# Patient Record
Sex: MALE | Race: WHITE | Employment: FULL TIME | URBAN - METROPOLITAN AREA
[De-identification: names, ages, dates, MRNs, and addresses within clinical notes are randomized per-mention and may not be internally consistent; named-entity substitution may affect disease eponyms.]

---

## 2017-01-04 PROCEDURE — 82274 ASSAY TEST FOR BLOOD FECAL: CPT | Mod: 90 | Performed by: FAMILY MEDICINE

## 2017-01-04 PROCEDURE — 99000 SPECIMEN HANDLING OFFICE-LAB: CPT | Performed by: FAMILY MEDICINE

## 2017-01-05 DIAGNOSIS — Z12.11 SPECIAL SCREENING FOR MALIGNANT NEOPLASMS, COLON: ICD-10-CM

## 2017-01-05 LAB — HEMOCCULT STL QL IA: NEGATIVE

## 2017-02-24 ENCOUNTER — OFFICE VISIT (OUTPATIENT)
Dept: CARDIOLOGY | Facility: CLINIC | Age: 57
End: 2017-02-24
Payer: COMMERCIAL

## 2017-02-24 VITALS
SYSTOLIC BLOOD PRESSURE: 121 MMHG | OXYGEN SATURATION: 96 % | DIASTOLIC BLOOD PRESSURE: 75 MMHG | WEIGHT: 247.4 LBS | BODY MASS INDEX: 35.5 KG/M2 | HEART RATE: 78 BPM

## 2017-02-24 DIAGNOSIS — I10 ESSENTIAL HYPERTENSION: ICD-10-CM

## 2017-02-24 DIAGNOSIS — Z95.2 S/P AVR: Primary | ICD-10-CM

## 2017-02-24 DIAGNOSIS — E78.5 HYPERLIPIDEMIA LDL GOAL <100: ICD-10-CM

## 2017-02-24 DIAGNOSIS — I21.4 NSTEMI (NON-ST ELEVATED MYOCARDIAL INFARCTION) (H): ICD-10-CM

## 2017-02-24 PROCEDURE — 99204 OFFICE O/P NEW MOD 45 MIN: CPT | Performed by: INTERNAL MEDICINE

## 2017-02-24 RX ORDER — ATORVASTATIN CALCIUM 40 MG/1
40 TABLET, FILM COATED ORAL DAILY
Qty: 90 TABLET | Refills: 3 | Status: SHIPPED | OUTPATIENT
Start: 2017-02-24 | End: 2018-04-10

## 2017-02-24 RX ORDER — OMEGA-3 FATTY ACIDS/FISH OIL 300-1000MG
200 CAPSULE ORAL EVERY 4 HOURS PRN
COMMUNITY

## 2017-02-24 RX ORDER — LISINOPRIL 10 MG/1
10 TABLET ORAL DAILY
Qty: 90 TABLET | Refills: 3 | Status: SHIPPED | OUTPATIENT
Start: 2017-02-24 | End: 2018-04-10

## 2017-02-24 RX ORDER — METOPROLOL TARTRATE 25 MG/1
12.5 TABLET, FILM COATED ORAL 2 TIMES DAILY
Qty: 90 TABLET | Refills: 3 | Status: SHIPPED | OUTPATIENT
Start: 2017-02-24 | End: 2018-04-10

## 2017-02-24 ASSESSMENT — PAIN SCALES - GENERAL: PAINLEVEL: NO PAIN (0)

## 2017-02-24 NOTE — PATIENT INSTRUCTIONS
Thank you for your [facility] visit today. Your provider has recommended the following:  Medication Changes:  None today. Continue taking your medications as you have been.  Recommendations:  Echocardiogram in 2 years  Follow-up:  See Dr. Lane for cardiology follow up in 2 years.  We kindly ask that you call cardiology scheduling at [phone] three months prior to requested revisit date to schedule future cardiology appointments.  Reminder:  1. Please bring in your current medication list or your medication, over the counter supplements and vitamin bottles as we will review these at each office visit.               [facility] HEART CARE  [facility]~[address]~[place], [place]~[address]  Questions about your visit today?  Call your Cardiology Clinic RN's-Amada Dow and/or Makayla Lancaster at [phone].

## 2017-02-24 NOTE — LETTER
2/24/2017    Homero Sanabria MD  [facility]   [address] [address]    RE: Roderick Willis       Dear Colleague,    I had the pleasure to follow up with your patient, Mr. Roderick Willis, in the Cardiovascular Medicine Clinic.  As you recall, this is a 56-year-old gentleman who is establishing local cardiac care.  His cardiac history is notable for apparent exertional dyspnea and chest pain leading to a presentation in 02/2016 to [facility].  At that point, the patient was diagnosed with significant and severe aortic stenosis.  He apparently had a bicuspid aortic valve with evidence of ascending aortic dilatation.  He underwent an evaluation which did not demonstrate any significant obstructive coronary artery disease and ultimately underwent repair with placement of a 23 mm Magna Juárez valve along with a 30 mm Gelweave graft of the ascending aorta.  The patient on followup studies has had evidence of patient prosthesis mismatch, however, has been followed from a clinical standpoint.  The ejection fraction is preserved and the Doppler parameters suggested PPM with a peak velocity of 3.2 meters per second, a mean gradient of 22 with an effective orifice area of 0.75 cm2.  The patient is currently asymptomatic.  He reports of no chest pain, PND, orthopnea, syncope or any other cardiac related concerns.  He states that the symptoms that plagued him leading to his presentation have all since resolved.  He presents to establish local cardiac care.      PAST MEDICAL HISTORY:   1.  Severe aortic stenosis, bicuspid in nature.   2.  Status post aortic valve replacement with a 23 mm Magna Juárez valve and repair of the ascending aorta with a 30 mm Gelweave graft.   3.  Closure of a patent foramen ovale identified during the intraoperative MAKENZIE.   4.  Hyperlipidemia.   5.  Hypertension.      CURRENT MEDICATIONS:   1.  Metoprolol 25 mg daily.   2.  Lipitor 40 mg daily.   3.   Lisinopril 10 mg daily.   4.  Aspirin 81 mg daily.      Please see additional noncardiac medications.      ALLERGIES:  No known drug allergies.      SOCIAL HISTORY:  Works in an automotive repair shop, former smoker, occasional alcohol intake.  Lives with family.      FAMILY HISTORY:  Negative for premature coronary artery disease or valvular heart disease.      REVIEW OF SYSTEMS:  10-point review of systems negative, otherwise as specified in the HPI.      PHYSICAL EXAMINATION:   VITAL SIGNS:  Blood pressure 121/75, heart rate is 78, weight 247 pounds.   GENERAL:  The patient is alert, oriented, no apparent distress.   HEENT:  Oropharynx is clear.   NECK:  Supple, no carotid bruits.   CARDIOVASCULAR:  Distant heart tones, normal S1, S2 with a 2/6 systolic ejection murmur best heard at the left upper sternal border.   LUNGS:  Coarse diffusely.  Patient is recovering from a cold with expiratory rhonchi.   ABDOMEN:  Obese, soft, nontender.   EXTREMITIES:  No edema.      ASSESSMENT:      1.  Bicuspid aortic valve, status post aortic valve replacement with a bioprosthetic valve and repair of the ascending aortic aneurysm.     2.  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  No symptoms at present.   3.  Hypertension.   4.  Hyperlipidemia.      RECOMMENDATIONS:  The patient is doing well from a clinical standpoint.  We will keep a close eye on his bioprosthetic valve by performing an echocardiogram in approximately 2 years' timeframe.  Patient is advised if he has any symptoms that are reminiscent of his presentation last year, he will contact us sooner.   I have taken the liberty of refilling his current medications and we will have him follow up with us in approximately 2 years' time.  Prior to that he will have a surveillance echocardiogram.   It was a pleasure being involved in his care.     Sincerely,    Brett Lane MD     [facility]

## 2017-02-24 NOTE — MR AVS SNAPSHOT
After Visit Summary   2/24/2017    Roderick Willis    MRN: [id number]           Patient Information     Date Of Birth          1960        Visit Information        Provider Department      2/24/2017 1:45 PM Brett Lane MD [facility] PHYSICIAN HEART AT [facility]        Today's Diagnoses     S/P AVR    -  1    NSTEMI (non-ST elevated myocardial infarction) (H)        Essential hypertension        Hyperlipidemia LDL goal <100          Care Instructions    Thank you for your  Heart Care visit today. Your provider has recommended the following:  Medication Changes:  None today. Continue taking your medications as you have been.  Recommendations:  Echocardiogram in 2 years  Follow-up:  See Dr. Lane for cardiology follow up in 2 years.  We kindly ask that you call cardiology scheduling at [phone] three months prior to requested revisit date to schedule future cardiology appointments.  Reminder:  1. Please bring in your current medication list or your medication, over the counter supplements and vitamin bottles as we will review these at each office visit.               [facility] CARE  [facility]~[address]~[place], [place]~[address]  Questions about your visit today?  Call your Cardiology Clinic RN's-Amada Dow and/or Makayla Lancaster at [phone].              Follow-ups after your visit        Additional Services     Follow-Up with Cardiologist                 Future tests that were ordered for you today     Open Future Orders        Priority Expected Expires Ordered    Echocardiogram Routine 2/24/2019 2/24/2020 2/24/2017    Follow-Up with Cardiologist Routine 2/24/2019 2/24/2020 2/24/2017            Who to contact     If you have questions or need follow up information about today's clinic visit or your schedule please contact [facility] PHYSICIAN HEART AT [facility] directly at [phone].  Normal  "or non-critical lab and imaging results will be communicated to you by MyChart, letter or phone within 4 business days after the clinic has received the results. If you do not hear from us within 7 days, please contact the clinic through [employer] or phone. If you have a critical or abnormal lab result, we will notify you by phone as soon as possible.  Submit refill requests through [employer] or call your pharmacy and they will forward the refill request to us. Please allow 3 business days for your refill to be completed.          Additional Information About Your Visit        [employer] Hospital[facility] [employer] Information     [employer] lets you send messages to your doctor, view your test results, renew your prescriptions, schedule appointments and more. To sign up, go to [email or web address] . Click on \"Log in\" on the left side of the screen, which will take you to the Welcome page. Then click on \"Sign up Now\" on the right side of the page.     You will be asked to enter the access code listed below, as well as some personal information. Please follow the directions to create your username and password.     Your access code is: [email or web address]  Expires: 3/9/2017  9:19 AM     Your access code will  in 90 days. If you need help or a new code, please call your [facility] clinic or [phone].        Care EveryWhere ID     This is your Care EveryWhere ID. This could be used by other organizations to access your [facility] medical records  [id number]        Your Vitals Were     Pulse Pulse Oximetry BMI (Body Mass Index)             78 96% 35.5 kg/m2          Blood Pressure from Last 3 Encounters:   17 121/75   16 114/70   16 (!) 160/93    Weight from Last 3 Encounters:   17 112.2 kg (247 lb 6.4 oz)   16 108.9 kg (240 lb)   04/15/16 99.1 kg (218 lb 8 oz)               Primary Care Provider Office Phone # Fax #    Homero Sanabria [phone]       [facility] [address]  [facility] [address]        Thank you!     Thank you for choosing [facility] PHYSICIAN HEART AT [facility]  for your care. Our goal is always to provide you with excellent care. Hearing back from our patients is one way we can continue to improve our services. Please take a few minutes to complete the written survey that you may receive in the mail after your visit with us. Thank you!             Your Updated Medication List - Protect others around you: Learn how to safely use, store and throw away your medicines at www.disposemymeds.org.          This list is accurate as of: 2/24/17  2:03 PM.  Always use your most recent med list.                   Brand Name Dispense Instructions for use    acetaminophen 650 MG 8 hour tablet     100 tablet    Take 650 mg by mouth every 4 hours as needed for other (surgical pain)       aspirin 81 MG tablet      Take 1 tablet (81 mg) by mouth daily       atorvastatin 40 MG tablet    LIPITOR    90 tablet    Take 1 tablet (40 mg) by mouth daily       ibuprofen 200 MG capsule      Take 200 mg by mouth every 4 hours as needed for fever       lisinopril 10 MG tablet    PRINIVIL/ZESTRIL    90 tablet    Take 1 tablet (10 mg) by mouth daily       metoprolol 25 MG tablet    LOPRESSOR    90 tablet    Take 0.5 tablets (12.5 mg) by mouth 2 times daily       multivitamin, therapeutic with minerals Tabs tablet     30 each    Take 1 tablet by mouth daily       neomycin-polymyxin-hydrocortisone 3.5-07298-5 otic suspension    CORTISPORIN    1 Bottle    Place 3 drops into both ears 3 times daily Prn.       omeprazole 20 MG CR capsule    priLOSEC    30 capsule    Take 1 capsule (20 mg) by mouth daily       triamcinolone 0.1 % cream    KENALOG    80 g    Apply sparingly to affected area three times daily for 14 days.

## 2017-02-24 NOTE — PROGRESS NOTES
HISTORY OF PRESENT ILLNESS:  I had the pleasure to follow up with your patient, Mr. Roderick Willis, in the Cardiovascular Medicine Clinic.  As you recall, this is a 56-year-old gentleman who is establishing local cardiac care.  His cardiac history is notable for apparent exertional dyspnea and chest pain leading to a presentation in 02/2016 to [facility].  At that point, the patient was diagnosed with significant and severe aortic stenosis.  He apparently had a bicuspid aortic valve with evidence of ascending aortic dilatation.  He underwent an evaluation which did not demonstrate any significant obstructive coronary artery disease and ultimately underwent repair with placement of a 23 mm Magna Juárez valve along with a 30 mm Gelweave graft of the ascending aorta.  The patient on followup studies has had evidence of patient prosthesis mismatch, however, has been followed from a clinical standpoint.  The ejection fraction is preserved and the Doppler parameters suggested PPM with a peak velocity of 3.2 meters per second, a mean gradient of 22 with an effective orifice area of 0.75 cm2.  The patient is currently asymptomatic.  He reports of no chest pain, PND, orthopnea, syncope or any other cardiac related concerns.  He states that the symptoms that plagued him leading to his presentation have all since resolved.  He presents to establish local cardiac care.      PAST MEDICAL HISTORY:   1.  Severe aortic stenosis, bicuspid in nature.   2.  Status post aortic valve replacement with a 23 mm Magna Juárez valve and repair of the ascending aorta with a 30 mm Gelweave graft.   3.  Closure of a patent foramen ovale identified during the intraoperative MAKENZIE.   4.  Hyperlipidemia.   5.  Hypertension.      CURRENT MEDICATIONS:   1.  Metoprolol 25 mg daily.   2.  Lipitor 40 mg daily.   3.  Lisinopril 10 mg daily.   4.  Aspirin 81 mg daily.      Please see additional noncardiac medications.      ALLERGIES:  No known  drug allergies.      SOCIAL HISTORY:  Works in an automotive repair shop, former smoker, occasional alcohol intake.  Lives with family.      FAMILY HISTORY:  Negative for premature coronary artery disease or valvular heart disease.      REVIEW OF SYSTEMS:  10-point review of systems negative, otherwise as specified in the HPI.      PHYSICAL EXAMINATION:   VITAL SIGNS:  Blood pressure 121/75, heart rate is 78, weight 247 pounds.   GENERAL:  The patient is alert, oriented, no apparent distress.   HEENT:  Oropharynx is clear.   NECK:  Supple, no carotid bruits.   CARDIOVASCULAR:  Distant heart tones, normal S1, S2 with a 2/6 systolic ejection murmur best heard at the left upper sternal border.   LUNGS:  Coarse diffusely.  Patient is recovering from a cold with expiratory rhonchi.   ABDOMEN:  Obese, soft, nontender.   EXTREMITIES:  No edema.      ASSESSMENT:      1.  Bicuspid aortic valve, status post aortic valve replacement with a bioprosthetic valve and repair of the ascending aortic aneurysm.     2.  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  No symptoms at present.   3.  Hypertension.   4.  Hyperlipidemia.      RECOMMENDATIONS:  The patient is doing well from a clinical standpoint.  We will keep a close eye on his bioprosthetic valve by performing an echocardiogram in approximately 2 years' timeframe.  Patient is advised if he has any symptoms that are reminiscent of his presentation last year, he will contact us sooner.   I have taken the liberty of refilling his current medications and we will have him follow up with us in approximately 2 years' time.  Prior to that he will have a surveillance echocardiogram.   It was a pleasure being involved in his care.      Brett Lane MD      cc:   Homero Sanabria MD   [facility]   [address] [place]   [address]         BRETT LANE MD             D: 02/24/2017 14:13   T: 02/24/2017 16:28    MT: STEVEN      Name:     TYLER MENESES   MRN:      [id number]        Account:      [id number]   :      1960           Service Date: 2017      Document: [id number]

## 2018-04-10 ENCOUNTER — TELEPHONE (OUTPATIENT)
Dept: FAMILY MEDICINE | Facility: CLINIC | Age: 58
End: 2018-04-10

## 2018-04-10 ENCOUNTER — OFFICE VISIT (OUTPATIENT)
Dept: FAMILY MEDICINE | Facility: CLINIC | Age: 58
End: 2018-04-10
Payer: COMMERCIAL

## 2018-04-10 VITALS
WEIGHT: 262 LBS | HEART RATE: 88 BPM | TEMPERATURE: 98.6 F | HEIGHT: 70 IN | BODY MASS INDEX: 37.51 KG/M2 | SYSTOLIC BLOOD PRESSURE: 134 MMHG | DIASTOLIC BLOOD PRESSURE: 78 MMHG

## 2018-04-10 DIAGNOSIS — Z23 NEED FOR VACCINATION: ICD-10-CM

## 2018-04-10 DIAGNOSIS — E11.9 TYPE 2 DIABETES MELLITUS WITHOUT COMPLICATION, WITHOUT LONG-TERM CURRENT USE OF INSULIN (H): Primary | ICD-10-CM

## 2018-04-10 DIAGNOSIS — I25.10 CORONARY ARTERY DISEASE DUE TO LIPID RICH PLAQUE: ICD-10-CM

## 2018-04-10 DIAGNOSIS — R06.83 SNORING: ICD-10-CM

## 2018-04-10 DIAGNOSIS — I25.83 CORONARY ARTERY DISEASE DUE TO LIPID RICH PLAQUE: ICD-10-CM

## 2018-04-10 DIAGNOSIS — E66.01 MORBID OBESITY (H): ICD-10-CM

## 2018-04-10 DIAGNOSIS — Z12.11 COLON CANCER SCREENING: ICD-10-CM

## 2018-04-10 DIAGNOSIS — I10 ESSENTIAL HYPERTENSION: ICD-10-CM

## 2018-04-10 DIAGNOSIS — Z12.5 SCREENING FOR PROSTATE CANCER: ICD-10-CM

## 2018-04-10 DIAGNOSIS — L30.9 ECZEMA, UNSPECIFIED TYPE: ICD-10-CM

## 2018-04-10 DIAGNOSIS — Z11.59 NEED FOR HEPATITIS C SCREENING TEST: ICD-10-CM

## 2018-04-10 DIAGNOSIS — E78.5 HYPERLIPIDEMIA LDL GOAL <100: ICD-10-CM

## 2018-04-10 LAB
ANION GAP SERPL CALCULATED.3IONS-SCNC: 5 MMOL/L (ref 3–14)
BUN SERPL-MCNC: 15 MG/DL (ref 7–30)
CALCIUM SERPL-MCNC: 9.1 MG/DL (ref 8.5–10.1)
CHLORIDE SERPL-SCNC: 100 MMOL/L (ref 94–109)
CHOLEST SERPL-MCNC: 165 MG/DL
CO2 SERPL-SCNC: 29 MMOL/L (ref 20–32)
CREAT SERPL-MCNC: 0.66 MG/DL (ref 0.66–1.25)
CREAT UR-MCNC: 112 MG/DL
GFR SERPL CREATININE-BSD FRML MDRD: >90 ML/MIN/1.7M2
GLUCOSE SERPL-MCNC: 177 MG/DL (ref 70–99)
HBA1C MFR BLD: 7.3 % (ref 0–6.4)
HCV AB SERPL QL IA: NONREACTIVE
HDLC SERPL-MCNC: 37 MG/DL
LDLC SERPL CALC-MCNC: ABNORMAL MG/DL
LDLC SERPL DIRECT ASSAY-MCNC: 73 MG/DL
MICROALBUMIN UR-MCNC: 24 MG/L
MICROALBUMIN/CREAT UR: 21.61 MG/G CR (ref 0–17)
NONHDLC SERPL-MCNC: 128 MG/DL
POTASSIUM SERPL-SCNC: 4.1 MMOL/L (ref 3.4–5.3)
PSA SERPL-ACNC: 0.75 UG/L (ref 0–4)
SODIUM SERPL-SCNC: 134 MMOL/L (ref 133–144)
TRIGL SERPL-MCNC: 513 MG/DL

## 2018-04-10 PROCEDURE — 83721 ASSAY OF BLOOD LIPOPROTEIN: CPT | Performed by: FAMILY MEDICINE

## 2018-04-10 PROCEDURE — 90732 PPSV23 VACC 2 YRS+ SUBQ/IM: CPT | Performed by: FAMILY MEDICINE

## 2018-04-10 PROCEDURE — 99214 OFFICE O/P EST MOD 30 MIN: CPT | Mod: 25 | Performed by: FAMILY MEDICINE

## 2018-04-10 PROCEDURE — 86803 HEPATITIS C AB TEST: CPT | Performed by: FAMILY MEDICINE

## 2018-04-10 PROCEDURE — 90471 IMMUNIZATION ADMIN: CPT | Performed by: FAMILY MEDICINE

## 2018-04-10 PROCEDURE — 80048 BASIC METABOLIC PNL TOTAL CA: CPT | Performed by: FAMILY MEDICINE

## 2018-04-10 PROCEDURE — 36415 COLL VENOUS BLD VENIPUNCTURE: CPT | Performed by: FAMILY MEDICINE

## 2018-04-10 PROCEDURE — 83036 HEMOGLOBIN GLYCOSYLATED A1C: CPT | Performed by: FAMILY MEDICINE

## 2018-04-10 PROCEDURE — 80061 LIPID PANEL: CPT | Performed by: FAMILY MEDICINE

## 2018-04-10 PROCEDURE — 82043 UR ALBUMIN QUANTITATIVE: CPT | Performed by: FAMILY MEDICINE

## 2018-04-10 PROCEDURE — G0103 PSA SCREENING: HCPCS | Performed by: FAMILY MEDICINE

## 2018-04-10 RX ORDER — ATORVASTATIN CALCIUM 40 MG/1
40 TABLET, FILM COATED ORAL DAILY
Qty: 90 TABLET | Refills: 3 | Status: SHIPPED | OUTPATIENT
Start: 2018-04-10 | End: 2019-04-17

## 2018-04-10 RX ORDER — NEOMYCIN SULFATE, POLYMYXIN B SULFATE AND HYDROCORTISONE 10; 3.5; 1 MG/ML; MG/ML; [USP'U]/ML
3 SUSPENSION/ DROPS AURICULAR (OTIC) 3 TIMES DAILY
Qty: 1 BOTTLE | Refills: 2 | Status: SHIPPED | OUTPATIENT
Start: 2018-04-10 | End: 2019-08-21

## 2018-04-10 RX ORDER — LISINOPRIL 10 MG/1
10 TABLET ORAL DAILY
Qty: 90 TABLET | Refills: 3 | Status: SHIPPED | OUTPATIENT
Start: 2018-04-10 | End: 2019-04-17

## 2018-04-10 RX ORDER — METOPROLOL TARTRATE 25 MG/1
12.5 TABLET, FILM COATED ORAL 2 TIMES DAILY
Qty: 90 TABLET | Refills: 3 | Status: SHIPPED | OUTPATIENT
Start: 2018-04-10 | End: 2019-04-17

## 2018-04-10 NOTE — MR AVS SNAPSHOT
After Visit Summary   4/10/2018    Roderick Willis    MRN: [id number]           Patient Information     Date Of Birth          1960        Visit Information        Provider Department      4/10/2018 8:40 AM Homero Sanabria MD [facility]        Today's Diagnoses     Type 2 diabetes mellitus without complication, without long-term current use of insulin (H)    -  1    Hyperlipidemia LDL goal <100        Essential hypertension        Coronary artery disease due to lipid rich plaque        Colon cancer screening        Need for hepatitis C screening test        Screening for prostate cancer        Snoring        Need for vaccination        NSTEMI (non-ST elevated myocardial infarction) (H)        Eczema, unspecified type          Care Instructions    I refilled your medications.    Please go to lab.    Plan is to follow up in in 6 months.    Please return your FIT test.    Please get a sleep consult since you have snoring and day time fatigue.          Thank you for choosing [facility].  You may be receiving a survey in the mail from [facility] regarding your visit today.  Please take a few minutes to complete and return the survey to let us know how we are doing.      If you have questions or concerns, please contact us via [employer] or you can contact your care team at [phone].    Our Clinic hours are:  Monday 6:40 am  to 7:00 pm  Tuesday -Friday 6:40 am to 5:00 pm    The [place] outpatient lab hours are:  Monday - Friday 6:10 am to 4:45 pm  Saturdays 7:00 am to 11:00 am  Appointments are required, call [phone]    If you have clinical questions after hours or would like to schedule an appointment,  call the clinic at [phone].            Follow-ups after your visit        Additional Services     SLEEP EVALUATION & MANAGEMENT REFERRAL - ADULT -[facility]  [phone] (Age 2 and up)       Please be aware that coverage of these services is  "subject to the terms and limitations of your health insurance plan.  Call member services at your health plan with any benefit or coverage questions.      Please bring the following to your appointment:    >>   List of current medications   >>   This referral request   >>   Any documents/labs given to you for this referral    Snoring, day time fatigue.                  Follow-up notes from your care team     Return in about 6 months (around 10/10/2018).      Future tests that were ordered for you today     Open Future Orders        Priority Expected Expires Ordered    SLEEP EVALUATION & MANAGEMENT REFERRAL - ADULT -[facility]  [phone] (Age 2 and up) Routine  4/10/2019 4/10/2018    Fecal colorectal cancer screen (FIT) Routine 5/1/2018 7/3/2018 4/10/2018            Who to contact     If you have questions or need follow up information about today's clinic visit or your schedule please contact [facility] directly at [phone].  Normal or non-critical lab and imaging results will be communicated to you by MyChart, letter or phone within 4 business days after the clinic has received the results. If you do not hear from us within 7 days, please contact the clinic through [employer] or phone. If you have a critical or abnormal lab result, we will notify you by phone as soon as possible.  Submit refill requests through [employer] or call your pharmacy and they will forward the refill request to us. Please allow 3 business days for your refill to be completed.          Additional Information About Your Visit        [employer] Information     [employer] lets you send messages to your doctor, view your test results, renew your prescriptions, schedule appointments and more. To sign up, go to [email or web address] . Click on \"Log in\" on the left side of the screen, which will take you to the Welcome page. Then click on \"Sign up Now\" on the right side of the page.     You will be asked to enter " "the access code listed below, as well as some personal information. Please follow the directions to create your username and password.     Your access code is: [email or web address]  Expires: 2018  9:31 AM     Your access code will  in 90 days. If you need help or a new code, please call your [facility] clinic or [phone].        Care EveryWhere ID     This is your Care EveryWhere ID. This could be used by other organizations to access your [facility] medical records  [id number]        Your Vitals Were     Pulse Temperature Height BMI (Body Mass Index)          88 98.6  F (37  C) (Tympanic) 5' 10.25\" (1.784 m) 37.33 kg/m2         Blood Pressure from Last 3 Encounters:   04/10/18 134/78   17 121/75   16 114/70    Weight from Last 3 Encounters:   04/10/18 262 lb (118.8 kg)   17 247 lb 6.4 oz (112.2 kg)   16 240 lb (108.9 kg)              We Performed the Following     ADMIN: Vaccine, Initial (55816)     Albumin Random Urine Quantitative with Creat Ratio     Basic metabolic panel     Hemoglobin A1c     Hepatitis C Screen Reflex to HCV RNA Quant and Genotype     Lipid panel reflex to direct LDL Fasting     Pneumococcal vaccine 23 valent PPSV23  (Pneumovax) [71598]     Prostate spec antigen screen          Today's Medication Changes          These changes are accurate as of 4/10/18  9:31 AM.  If you have any questions, ask your nurse or doctor.               Stop taking these medicines if you haven't already. Please contact your care team if you have questions.     acetaminophen 650 MG 8 hour tablet   Stopped by:  Homero Sanabria MD                Where to get your medicines      These medications were sent to [facility] - [place], [place] - [address] AT [facility] to [facility]  [address], [facility] [address]     Phone:  [phone]     atorvastatin 40 MG tablet    lisinopril 10 MG tablet    metoprolol tartrate 25 MG tablet         These " medications were sent to [facility] [place], [place] - [address]  [address] [address]     Phone:  [phone]     neomycin-polymyxin-hydrocortisone 3.5-87756-0 otic suspension                Primary Care Provider Office Phone # Fax #    Homero Sanabria [phone]       [address] [address]        Equal Access to Services     JUAN LUIS CARSON : Hadii aad ku hadasho Soomaali, waaxda luqadaha, qaybta kaalmada adeegyada, waxay idiin hayaan adeeg kharash lateodoro . So [facility] [phone].    ATENCIÓN: Si habla español, tiene a hagen disposición servicios gratuitos de asistencia lingüística. [facility] [phone].    We comply with applicable federal civil rights laws and [place] laws. We do not discriminate on the basis of race, color, national origin, age, disability, sex, sexual orientation, or gender identity.            Thank you!     Thank you for choosing [facility]  for your care. Our goal is always to provide you with excellent care. Hearing back from our patients is one way we can continue to improve our services. Please take a few minutes to complete the written survey that you may receive in the mail after your visit with us. Thank you!             Your Updated Medication List - Protect others around you: Learn how to safely use, store and throw away your medicines at www.disposemymeds.org.          This list is accurate as of 4/10/18  9:31 AM.  Always use your most recent med list.                   Brand Name Dispense Instructions for use Diagnosis    aspirin 81 MG tablet      Take 1 tablet (81 mg) by mouth daily        atorvastatin 40 MG tablet    LIPITOR    90 tablet    Take 1 tablet (40 mg) by mouth daily    NSTEMI (non-ST elevated myocardial infarction) (H), Hyperlipidemia LDL goal <100       ibuprofen 200 MG capsule      Take 200 mg by mouth every 4 hours as needed for fever        lisinopril 10 MG tablet     PRINIVIL/ZESTRIL    90 tablet    Take 1 tablet (10 mg) by mouth daily    Essential hypertension       metoprolol tartrate 25 MG tablet    LOPRESSOR    90 tablet    Take 0.5 tablets (12.5 mg) by mouth 2 times daily    NSTEMI (non-ST elevated myocardial infarction) (H), Essential hypertension       multivitamin, therapeutic with minerals Tabs tablet     30 each    Take 1 tablet by mouth daily    Bioprosthetic aortic valve replacement during current hospitalization, Essential hypertension       neomycin-polymyxin-hydrocortisone 3.5-92295-8 otic suspension    CORTISPORIN    1 Bottle    Place 3 drops into both ears 3 times daily Prn.    Eczema, unspecified type       triamcinolone 0.1 % cream    KENALOG    80 g    Apply sparingly to affected area three times daily for 14 days.    Rash

## 2018-04-10 NOTE — PATIENT INSTRUCTIONS
I refilled your medications.    Please go to lab.    Plan is to follow up in in 6 months.    Please return your FIT test.    Please get a sleep consult since you have snoring and day time fatigue.          Thank you for choosing [facility].  You may be receiving a survey in the mail from Stephanie Escobar regarding your visit today.  Please take a few minutes to complete and return the survey to let us know how we are doing.      If you have questions or concerns, please contact us via [employer] or you can contact your care team at [phone].    Our Clinic hours are:  Monday 6:40 am  to 7:00 pm  Tuesday -Friday 6:40 am to 5:00 pm    The [place] outpatient lab hours are:  Monday - Friday 6:10 am to 4:45 pm  Saturdays 7:00 am to 11:00 am  Appointments are required, call [phone]    If you have clinical questions after hours or would like to schedule an appointment,  call the clinic at [phone].

## 2018-04-10 NOTE — TELEPHONE ENCOUNTER
Reason for Call:  Other med    Detailed comments: Med Rx'd today is too expensive.  Peter is suggesting Ofloxacin.    Phone Number Pharmacy can be reached at: Other phone number:  [phone]    Best Time: any    Can we leave a detailed message on this number? YES    Call taken on 4/10/2018 at 10:02 AM by Lashanda Zhou

## 2018-04-10 NOTE — PROGRESS NOTES
SUBJECTIVE:   Rdoerick Willis is a 57 year old male who presents to clinic today for the following health issues:  Chief Complaint   Patient presents with     Medication Refill     Diabetes     is fasting for labs. Has not taken his medication this morning either.     Health Maintenance     due for FIT test-ordered       Diabetes Follow-up      Patient is checking blood sugars: not at all    Diabetic concerns: None     Symptoms of hypoglycemia (low blood sugar): none     Paresthesias (numbness or burning in feet) or sores: No     Date of last diabetic eye exam: 3 years ago, is due. Reminded him to make appt.    Hyperlipidemia Follow-Up      Rate your low fat/cholesterol diet?: not monitoring fat    Taking statin?  Yes, no muscle aches from statin    Other lipid medications/supplements?:  none    Hypertension Follow-up      Outpatient blood pressures are not being checked.    Low Salt Diet: no added salt    BP Readings from Last 2 Encounters:   04/10/18 134/78   02/24/17 121/75     Hemoglobin A1C (%)   Date Value   12/09/2016 5.9   03/04/2016 5.7     LDL Cholesterol Calculated (mg/dL)   Date Value   12/09/2016 50   03/04/2015 75       Amount of exercise or physical activity: None    Problems taking medications regularly: No    Medication side effects: dry mouth, but could be from snoring    Diet: low salt and low carb            Problem list and histories reviewed & adjusted, as indicated.  Additional history: as documented        Reviewed and updated as needed this visit by clinical staff  Tobacco  Allergies  Meds  Med Hx  Surg Hx  Fam Hx  Soc Hx      Reviewed and updated as needed this visit by Provider         ROS:  CONSTITUTIONAL:NEGATIVE for fever, chills, change in weight  INTEGUMENTARY/SKIN: NEGATIVE for worrisome rashes, moles or lesions  RESP:NEGATIVE for significant cough or SOB  CV: NEGATIVE for chest pain, palpitations or peripheral edema  MUSCULOSKELETAL: NEGATIVE for significant arthralgias or  "myalgia  NEURO: NEGATIVE for weakness, dizziness or paresthesias  PSYCHIATRIC: NEGATIVE for changes in mood or affect    OBJECTIVE:                                                    /78 (Cuff Size: Adult Large)  Pulse 88  Temp 98.6  F (37  C) (Tympanic)  Ht 5' 10.25\" (1.784 m)  Wt 262 lb (118.8 kg)  BMI 37.33 kg/m2  Body mass index is 37.33 kg/(m^2).  GENERAL APPEARANCE: alert, no distress and cooperative  RESP: lungs clear to auscultation - no rales, rhonchi or wheezes  CV: regular rates and rhythm, normal S1 S2, no S3 or S4 and no murmur, click or rub  ABDOMEN: soft, nontender, without hepatosplenomegaly or masses and bowel sounds normal  MS: extremities normal- no gross deformities noted  SKIN: no suspicious lesions or rashes  NEURO: Normal strength and tone, mentation intact and speech normal  DIABETIC FOOT EXAM: normal DP and PT pulses, no trophic changes or ulcerative lesions and normal sensory exam  PSYCH: mentation appears normal and affect normal/bright         ASSESSMENT/PLAN:                                                    1. Type 2 diabetes mellitus without complication, without long-term current use of insulin (H)  Controlled however add metformin  - Hemoglobin A1c  - Basic metabolic panel  - Lipid panel reflex to direct LDL Fasting  - Albumin Random Urine Quantitative with Creat Ratio    2. Hyperlipidemia LDL goal <100  controlled  - atorvastatin (LIPITOR) 40 MG tablet; Take 1 tablet (40 mg) by mouth daily  Dispense: 90 tablet; Refill: 3    3. Essential hypertension  Controlled and refilled  - metoprolol tartrate (LOPRESSOR) 25 MG tablet; Take 0.5 tablets (12.5 mg) by mouth 2 times daily  Dispense: 90 tablet; Refill: 3  - lisinopril (PRINIVIL/ZESTRIL) 10 MG tablet; Take 1 tablet (10 mg) by mouth daily  Dispense: 90 tablet; Refill: 3    4. Coronary artery disease due to lipid rich plaque  stable    5. Colon cancer screening    - Fecal colorectal cancer screen (FIT); Future    6. Need for " hepatitis C screening test    - Hepatitis C Screen Reflex to HCV RNA Quant and Genotype    7. Screening for prostate cancer    - Prostate spec antigen screen    8. Snoring  He also brought up he has fatigue and snores. Can fall asleep during the day.  Will get a sleep consult  - SLEEP EVALUATION & MANAGEMENT REFERRAL - ADULT -[facility]  [phone] (Age 2 and up); Future    9. Need for vaccination    - Pneumococcal vaccine 23 valent PPSV23  (Pneumovax) [53989]  - ADMIN: Vaccine, Initial (07689)    10. NSTEMI (non-ST elevated myocardial infarction) (H)    - metoprolol tartrate (LOPRESSOR) 25 MG tablet; Take 0.5 tablets (12.5 mg) by mouth 2 times daily  Dispense: 90 tablet; Refill: 3  - atorvastatin (LIPITOR) 40 MG tablet; Take 1 tablet (40 mg) by mouth daily  Dispense: 90 tablet; Refill: 3    11. Eczema, unspecified type  Refilled med,  Per his request  - neomycin-polymyxin-hydrocortisone (CORTISPORIN) 3.5-66814-7 otic suspension; Place 3 drops into both ears 3 times daily Prn.  Dispense: 1 Bottle; Refill: 2    Morbid obesity, work on wt to help with DM and CAD  See Patient Instructions    Homero Sanabria MD  [facility]

## 2018-04-11 NOTE — TELEPHONE ENCOUNTER
This medication is for an infection.  The original medication Isent has the steroid in it for his itchy canals.  He needs to have steroid component and the alternative does not have it.  I talked with the pharmacist yesterday and they stated there was not a cheaper substitute with the steroid component.  Homero Sanabria MD  Family Medicine

## 2018-04-12 PROCEDURE — 82274 ASSAY TEST FOR BLOOD FECAL: CPT | Performed by: FAMILY MEDICINE

## 2018-04-13 ENCOUNTER — TELEPHONE (OUTPATIENT)
Dept: FAMILY MEDICINE | Facility: CLINIC | Age: 58
End: 2018-04-13

## 2018-04-13 DIAGNOSIS — E11.9 TYPE 2 DIABETES MELLITUS WITHOUT COMPLICATION, WITHOUT LONG-TERM CURRENT USE OF INSULIN (H): Primary | ICD-10-CM

## 2018-04-13 NOTE — TELEPHONE ENCOUNTER
Diabetes Education Scheduling Outreach #1:    Call to patient to schedule. Left message with phone number to call to schedule.    Plan for 2nd outreach attempt within 1 week.    Anny Keane OnCall  Diabetes and Nutrition Scheduling

## 2018-04-14 DIAGNOSIS — Z12.11 COLON CANCER SCREENING: ICD-10-CM

## 2018-04-14 LAB — HEMOCCULT STL QL IA: NEGATIVE

## 2018-05-22 NOTE — TELEPHONE ENCOUNTER
Diabetes Education Scheduling Outreach #2:    Call to patient to schedule. Left message with phone number to call to schedule.    Letter sent to patient requesting to call to schedule.    Anny Keane OnCall  Diabetes and Nutrition Scheduling

## 2018-10-31 DIAGNOSIS — E11.65 UNCONTROLLED TYPE 2 DIABETES MELLITUS WITH HYPERGLYCEMIA (H): ICD-10-CM

## 2018-10-31 DIAGNOSIS — E78.5 HYPERLIPIDEMIA LDL GOAL <100: ICD-10-CM

## 2018-10-31 DIAGNOSIS — E11.9 TYPE 2 DIABETES MELLITUS WITHOUT COMPLICATION, WITHOUT LONG-TERM CURRENT USE OF INSULIN (H): Primary | ICD-10-CM

## 2018-10-31 LAB
CHOLEST SERPL-MCNC: 172 MG/DL
HBA1C MFR BLD: 8.1 % (ref 0–5.6)
HDLC SERPL-MCNC: 34 MG/DL
LDLC SERPL CALC-MCNC: ABNORMAL MG/DL
LDLC SERPL DIRECT ASSAY-MCNC: 77 MG/DL
NONHDLC SERPL-MCNC: 138 MG/DL
TRIGL SERPL-MCNC: 512 MG/DL

## 2018-10-31 PROCEDURE — 83036 HEMOGLOBIN GLYCOSYLATED A1C: CPT | Performed by: FAMILY MEDICINE

## 2018-10-31 PROCEDURE — 36415 COLL VENOUS BLD VENIPUNCTURE: CPT | Performed by: FAMILY MEDICINE

## 2018-10-31 PROCEDURE — 83721 ASSAY OF BLOOD LIPOPROTEIN: CPT | Performed by: FAMILY MEDICINE

## 2018-10-31 PROCEDURE — 80061 LIPID PANEL: CPT | Performed by: FAMILY MEDICINE

## 2018-10-31 NOTE — LETTER
[facility]  [address] [address]  [phone]        March 9, 2019    Roderick Willis  [address] [place] [id number]              Dear Roderick Willis    This is to remind you that your Thyroid Function is due.    You may call our office at  to schedule an appointment.    Please disregard this notice if you have already had your labs drawn or made an appointment.        Sincerely,        VIVIANA AIKEN MD

## 2019-01-16 ENCOUNTER — HOSPITAL ENCOUNTER (EMERGENCY)
Facility: CLINIC | Age: 59
Discharge: HOME | End: 2019-01-16
Attending: PHYSICIAN ASSISTANT | Admitting: PHYSICIAN ASSISTANT
Payer: COMMERCIAL

## 2019-01-16 ENCOUNTER — APPOINTMENT (OUTPATIENT)
Dept: GENERAL RADIOLOGY | Facility: CLINIC | Age: 59
End: 2019-01-16
Payer: COMMERCIAL

## 2019-01-16 VITALS
RESPIRATION RATE: 16 BRPM | TEMPERATURE: 99.5 F | DIASTOLIC BLOOD PRESSURE: 85 MMHG | BODY MASS INDEX: 36.54 KG/M2 | OXYGEN SATURATION: 94 % | HEIGHT: 71 IN | SYSTOLIC BLOOD PRESSURE: 145 MMHG

## 2019-01-16 DIAGNOSIS — R05.9 COUGH: ICD-10-CM

## 2019-01-16 DIAGNOSIS — J11.1 INFLUENZA-LIKE ILLNESS: ICD-10-CM

## 2019-01-16 LAB
FLUAV+FLUBV AG SPEC QL: NEGATIVE
FLUAV+FLUBV AG SPEC QL: NEGATIVE
SPECIMEN SOURCE: NORMAL

## 2019-01-16 PROCEDURE — 71046 X-RAY EXAM CHEST 2 VIEWS: CPT

## 2019-01-16 PROCEDURE — 99214 OFFICE O/P EST MOD 30 MIN: CPT | Mod: Z6 | Performed by: PHYSICIAN ASSISTANT

## 2019-01-16 PROCEDURE — G0463 HOSPITAL OUTPT CLINIC VISIT: HCPCS | Mod: 25 | Performed by: PHYSICIAN ASSISTANT

## 2019-01-16 PROCEDURE — 87804 INFLUENZA ASSAY W/OPTIC: CPT | Performed by: PHYSICIAN ASSISTANT

## 2019-01-16 ASSESSMENT — ENCOUNTER SYMPTOMS
SHORTNESS OF BREATH: 0
CARDIOVASCULAR NEGATIVE: 1
ARTHRALGIAS: 1
FEVER: 1
COUGH: 1
NEUROLOGICAL NEGATIVE: 1

## 2019-01-16 NOTE — ED PROVIDER NOTES
History     Chief Complaint   Patient presents with     URI     cough, non productive, body aches and fever     HPI  Roderick Willis is a 58 year old male with hx HTN, DM, aortic valve disease s/p aortic valve replacement who presents with complaints of productive cough, body aches, and fevers for the past 3-4 days.  Pt denies any associated shortness of breath or chest pain.  He has not taken his temperature at home but reports he feels as if he has a fever.  Denies rash, sinus pressure, nasal congestion, rhinorrhea, nausea, vomiting, or abdominal pain.  Pt did not receive the influenza vaccination this year.  Pt denies hx asthma or underlying lung disease.  He reports having history of bacterial pneumonia.      Allergies:  Allergies   Allergen Reactions     Seasonal Allergies        Problem List:    Patient Active Problem List    Diagnosis Date Noted     Morbid obesity (H) 04/10/2018     Priority: Medium     Aortic valve disease 03/03/2016     Priority: Medium     Critical aortic valve stenosis 02/08/2016     Priority: Medium     NSTEMI (non-ST elevated myocardial infarction) (H) 02/06/2016     Priority: Medium     Type 2 diabetes mellitus without complication (H) 10/19/2015     Priority: Medium     Heart murmur 03/04/2015     Priority: Medium     Heavy alcohol use 01/14/2014     Priority: Medium     Hyperlipidemia LDL goal <100 09/20/2012     Priority: Medium     Essential hypertension 02/08/2007     Priority: Medium     Problem list name updated by automated process. Provider to review          Past Medical History:    Past Medical History:   Diagnosis Date     Critical aortic valve stenosis      Dyslipidemia      Hypertension        Past Surgical History:    Past Surgical History:   Procedure Laterality Date     APPENDECTOMY  10/6/1988     REPAIR VALVE AORTIC N/A 3/3/2016    Procedure: REPAIR VALVE AORTIC;  Surgeon: Isaac Yin MD;  Location: [facility] OR     VASECTOMY         Family History:    Family History  "  Problem Relation Age of Onset     Cancer Mother      Breast Cancer Mother      Heart Disease Maternal Grandmother      Respiratory Maternal Grandfather         copd     Diabetes Paternal Grandfather      Hypertension Father      Cataracts Father      Hyperlipidemia Father        Social History:  Marital Status:   [2]  Social History     Tobacco Use     Smoking status: Former Smoker     Types: Cigars     Smokeless tobacco: Never Used   Substance Use Topics     Alcohol use: Yes     Comment: 12 pack beer per week     Drug use: No        Medications:      aspirin 81 MG tablet   atorvastatin (LIPITOR) 40 MG tablet   ibuprofen 200 MG capsule   lisinopril (PRINIVIL/ZESTRIL) 10 MG tablet   metFORMIN (GLUCOPHAGE) 500 MG tablet   metoprolol tartrate (LOPRESSOR) 25 MG tablet   multivitamin, therapeutic with minerals (THERA-VIT-M) TABS   neomycin-polymyxin-hydrocortisone (CORTISPORIN) 3.5-38249-5 otic suspension   triamcinolone (KENALOG) 0.1 % cream         Review of Systems   Constitutional: Positive for fever.   HENT: Negative.    Respiratory: Positive for cough. Negative for shortness of breath.    Cardiovascular: Negative.  Negative for chest pain.   Musculoskeletal: Positive for arthralgias.   Skin: Negative.    Neurological: Negative.    All other systems reviewed and are negative.      Physical Exam   BP: 145/85  Heart Rate: 99  Temp: 99.5  F (37.5  C)  Resp: 16  Height: 180.3 cm (5' 11\")  SpO2: 94 %      Physical Exam   Constitutional: He is oriented to person, place, and time. He appears well-developed and well-nourished.  Non-toxic appearance. No distress.   HENT:   Head: Normocephalic and atraumatic.   Right Ear: Hearing, tympanic membrane, external ear and ear canal normal.   Left Ear: Hearing, tympanic membrane, external ear and ear canal normal.   Nose: Nose normal.   Mouth/Throat: Oropharynx is clear and moist and mucous membranes are normal. No uvula swelling. No oropharyngeal exudate, posterior " oropharyngeal edema, posterior oropharyngeal erythema or tonsillar abscesses.   Eyes: Conjunctivae and EOM are normal. Pupils are equal, round, and reactive to light.   Neck: Normal range of motion and full passive range of motion without pain. Neck supple. No neck rigidity. Normal range of motion present.   Cardiovascular: Normal rate, regular rhythm and normal heart sounds.   Pulmonary/Chest: Effort normal and breath sounds normal. No stridor. No respiratory distress. He has no decreased breath sounds. He has no wheezes. He has no rhonchi. He has no rales.   Musculoskeletal: Normal range of motion.   Lymphadenopathy:     He has no cervical adenopathy.   Neurological: He is alert and oriented to person, place, and time.   Skin: Skin is warm and dry. No rash noted.       ED Course        Procedures    Results for orders placed or performed during the hospital encounter of 01/16/19 (from the past 24 hour(s))   Influenza A/B antigen   Result Value Ref Range    Influenza A/B Agn Specimen Nasal     Influenza A Negative NEG^Negative    Influenza B Negative NEG^Negative   XR Chest 2 Views    Narrative    CHEST TWO VIEWS  1/16/2019 2:07 PM     HISTORY:  Cough, fevers.    COMPARISON: 5/17/2016      Impression    IMPRESSION: No acute abnormality. Lungs are well-inflated and clear.  Postoperative change of sternotomy and bioprosthetic aortic valve  placement are demonstrated. Cardiac silhouette is enlarged, unchanged.    GANGA WHEELER MD       Medications - No data to display    Assessments & Plan (with Medical Decision Making)     Pt is a 58 year old male with hx HTN, DM, aortic valve disease s/p aortic valve replacement who presents with complaints of productive cough, body aches, and fevers for the past 3-4 days.  Pt denies any associated shortness of breath or chest pain.  He has not taken his temperature at home but reports he feels as if he has a fever.  He did not receive the influenza vaccination.  Pt is afebrile on  arrival.  Exam as above.  Influenza was negative.  CXR was negative for pneumonia or acute pathology.  Discussed results with patient.  Encouraged symptomatic treatments at home.  Return precautions were reviewed.  Hand-outs were provided.    Patient was instructed to follow-up with PCP if no improvement in 3-5 days for continued care and management or sooner if new or worsening symptoms.  He is to return to the ED for persistent and/or worsening symptoms.  Patient expressed understanding of the diagnosis and plan and was discharged home in good condition.    I have reviewed the nursing notes.    I have reviewed the findings, diagnosis, plan and need for follow up with the patient.       Medication List      There are no discharge medications for this visit.         Final diagnoses:   Cough   Influenza-like illness       1/16/2019   [facility] EMERGENCY DEPARTMENT      Disclaimer:  This note consists of symbols derived from keyboarding, dictation and/or voice recognition software.  As a result, there may be errors in the script that have gone undetected.  Please consider this when interpreting information found in this chart.     Manju Blood PA-C  01/16/19 1443       Manju Blood PA-C  01/16/19 1440

## 2019-01-16 NOTE — ED AVS SNAPSHOT
[facility]  [address] [address]  Phone:  [phone]  Fax:  [phone]                                    Roderick Willis   MRN: [id number]    Department:  [facility]   Date of Visit:  1/16/2019           After Visit Summary Signature Page    I have received my discharge instructions, and my questions have been answered. I have discussed any challenges I see with this plan with the nurse or doctor.    ..........................................................................................................................................  Patient/Patient Representative Signature      ..........................................................................................................................................  Patient Representative Print Name and Relationship to Patient    ..................................................               ................................................  Date                                   Time    ..........................................................................................................................................  Reviewed by Signature/Title    ...................................................              ..............................................  Date                                               Time          22EPIC Rev 08/18

## 2019-02-13 ENCOUNTER — TRANSFERRED RECORDS (OUTPATIENT)
Dept: HEALTH INFORMATION MANAGEMENT | Facility: CLINIC | Age: 59
End: 2019-02-13

## 2019-02-13 ENCOUNTER — TRANSFERRED RECORDS (OUTPATIENT)
Dept: MULTI SPECIALTY CLINIC | Facility: CLINIC | Age: 59
End: 2019-02-13

## 2019-02-13 LAB — RETINOPATHY: NEGATIVE

## 2019-03-20 ENCOUNTER — HOSPITAL ENCOUNTER (OUTPATIENT)
Dept: CARDIOLOGY | Facility: CLINIC | Age: 59
Discharge: HOME | End: 2019-03-20
Attending: INTERNAL MEDICINE | Admitting: INTERNAL MEDICINE
Payer: COMMERCIAL

## 2019-03-20 DIAGNOSIS — E11.9 TYPE 2 DIABETES MELLITUS WITHOUT COMPLICATION, WITHOUT LONG-TERM CURRENT USE OF INSULIN (H): ICD-10-CM

## 2019-03-20 DIAGNOSIS — Z95.2 S/P AVR: ICD-10-CM

## 2019-03-20 DIAGNOSIS — E11.65 UNCONTROLLED TYPE 2 DIABETES MELLITUS WITH HYPERGLYCEMIA (H): ICD-10-CM

## 2019-03-20 LAB
ANION GAP SERPL CALCULATED.3IONS-SCNC: 7 MMOL/L (ref 3–14)
BUN SERPL-MCNC: 16 MG/DL (ref 7–30)
CALCIUM SERPL-MCNC: 8.5 MG/DL (ref 8.5–10.1)
CHLORIDE SERPL-SCNC: 100 MMOL/L (ref 94–109)
CHOLEST SERPL-MCNC: 153 MG/DL
CO2 SERPL-SCNC: 26 MMOL/L (ref 20–32)
CREAT SERPL-MCNC: 0.7 MG/DL (ref 0.66–1.25)
CREAT UR-MCNC: 103 MG/DL
GFR SERPL CREATININE-BSD FRML MDRD: >90 ML/MIN/{1.73_M2}
GLUCOSE SERPL-MCNC: 275 MG/DL (ref 70–99)
HBA1C MFR BLD: 8.8 % (ref 0–5.6)
HDLC SERPL-MCNC: 31 MG/DL
LDLC SERPL CALC-MCNC: ABNORMAL MG/DL
LDLC SERPL DIRECT ASSAY-MCNC: 59 MG/DL
MICROALBUMIN UR-MCNC: 34 MG/L
MICROALBUMIN/CREAT UR: 33.4 MG/G CR (ref 0–17)
NONHDLC SERPL-MCNC: 122 MG/DL
POTASSIUM SERPL-SCNC: 4.1 MMOL/L (ref 3.4–5.3)
SODIUM SERPL-SCNC: 133 MMOL/L (ref 133–144)
TRIGL SERPL-MCNC: 565 MG/DL
TSH SERPL DL<=0.005 MIU/L-ACNC: 0.61 MU/L (ref 0.4–4)

## 2019-03-20 PROCEDURE — 40000264 ECHOCARDIOGRAM COMPLETE

## 2019-03-20 PROCEDURE — 25500064 ZZH RX 255 OP 636: Performed by: INTERNAL MEDICINE

## 2019-03-20 PROCEDURE — 93306 TTE W/DOPPLER COMPLETE: CPT | Mod: 26 | Performed by: INTERNAL MEDICINE

## 2019-03-20 PROCEDURE — 80061 LIPID PANEL: CPT | Performed by: FAMILY MEDICINE

## 2019-03-20 PROCEDURE — 83721 ASSAY OF BLOOD LIPOPROTEIN: CPT | Performed by: FAMILY MEDICINE

## 2019-03-20 PROCEDURE — 82043 UR ALBUMIN QUANTITATIVE: CPT | Performed by: FAMILY MEDICINE

## 2019-03-20 PROCEDURE — 83036 HEMOGLOBIN GLYCOSYLATED A1C: CPT | Performed by: FAMILY MEDICINE

## 2019-03-20 PROCEDURE — 80048 BASIC METABOLIC PNL TOTAL CA: CPT | Performed by: FAMILY MEDICINE

## 2019-03-20 PROCEDURE — 84443 ASSAY THYROID STIM HORMONE: CPT | Performed by: FAMILY MEDICINE

## 2019-03-20 PROCEDURE — 36415 COLL VENOUS BLD VENIPUNCTURE: CPT | Performed by: FAMILY MEDICINE

## 2019-03-20 RX ADMIN — HUMAN ALBUMIN MICROSPHERES AND PERFLUTREN 2 ML: 10; .22 INJECTION, SOLUTION INTRAVENOUS at 14:39

## 2019-03-21 NOTE — RESULT ENCOUNTER NOTE
Covering for primary/ordering provider  Your triglycerides are elevated, similar to previous values.  There is microscopic traces of protein in the urine, which is the first sign of diabetic kidney disease.  The thyroid is normal  Kidney function, electrolytes are normal.  The hemoglobin A1c is more elevated compared to 4 months ago.  It appears you are overdue to see Dr. Sanabria for diabetic follow-up, please make an appointment to see him

## 2019-04-12 ENCOUNTER — TELEPHONE (OUTPATIENT)
Dept: FAMILY MEDICINE | Facility: CLINIC | Age: 59
End: 2019-04-12

## 2019-04-12 NOTE — TELEPHONE ENCOUNTER
Reason for Call:  Other orders    Detailed comments: Patient would like orders for a blood draw prior to appointment on 4/17/19.  He wants to see where his A1C is at.    Phone Number Patient can be reached at: Cell number on file:    Telephone Information:   Mobile [phone]       Best Time: any    Can we leave a detailed message on this number? YES    Call taken on 4/12/2019 at 3:15 PM by Lashanda Zhou

## 2019-04-12 NOTE — TELEPHONE ENCOUNTER
Lab Results   Component Value Date    A1C 8.8 03/20/2019    A1C 8.1 10/31/2018    A1C 7.3 04/10/2018    A1C 5.9 12/09/2016    A1C 5.7 03/04/2016     It is too soon for the a1c recheck.  Pt is not on insulin.   He is managed orally with metformin.    Pt will follow up with Dr Sanabria on 4/17/19 as arranged.    Norma Escobar RN

## 2019-04-17 ENCOUNTER — HOSPITAL ENCOUNTER (OUTPATIENT)
Dept: CARDIOLOGY | Facility: CLINIC | Age: 59
Discharge: HOME | End: 2019-04-17
Attending: INTERNAL MEDICINE | Admitting: INTERNAL MEDICINE
Payer: COMMERCIAL

## 2019-04-17 ENCOUNTER — OFFICE VISIT (OUTPATIENT)
Dept: CARDIOLOGY | Facility: CLINIC | Age: 59
End: 2019-04-17
Attending: INTERNAL MEDICINE
Payer: COMMERCIAL

## 2019-04-17 VITALS
HEART RATE: 94 BPM | SYSTOLIC BLOOD PRESSURE: 143 MMHG | WEIGHT: 262 LBS | OXYGEN SATURATION: 94 % | BODY MASS INDEX: 36.54 KG/M2 | DIASTOLIC BLOOD PRESSURE: 84 MMHG

## 2019-04-17 DIAGNOSIS — I25.10 CORONARY ARTERY DISEASE DUE TO LIPID RICH PLAQUE: ICD-10-CM

## 2019-04-17 DIAGNOSIS — E78.5 HYPERLIPIDEMIA LDL GOAL <100: ICD-10-CM

## 2019-04-17 DIAGNOSIS — I10 ESSENTIAL HYPERTENSION: ICD-10-CM

## 2019-04-17 DIAGNOSIS — I21.4 NSTEMI (NON-ST ELEVATED MYOCARDIAL INFARCTION) (H): ICD-10-CM

## 2019-04-17 DIAGNOSIS — I21.4 NSTEMI (NON-ST ELEVATED MYOCARDIAL INFARCTION) (H): Primary | ICD-10-CM

## 2019-04-17 DIAGNOSIS — I25.83 CORONARY ARTERY DISEASE DUE TO LIPID RICH PLAQUE: ICD-10-CM

## 2019-04-17 DIAGNOSIS — Z95.2 S/P AVR: ICD-10-CM

## 2019-04-17 PROCEDURE — 93005 ELECTROCARDIOGRAM TRACING: CPT

## 2019-04-17 PROCEDURE — 99213 OFFICE O/P EST LOW 20 MIN: CPT | Performed by: INTERNAL MEDICINE

## 2019-04-17 RX ORDER — LISINOPRIL 10 MG/1
10 TABLET ORAL DAILY
Qty: 90 TABLET | Refills: 3 | Status: SHIPPED | OUTPATIENT
Start: 2019-04-17 | End: 2019-05-02

## 2019-04-17 RX ORDER — METOPROLOL TARTRATE 25 MG/1
12.5 TABLET, FILM COATED ORAL 2 TIMES DAILY
Qty: 90 TABLET | Refills: 3 | Status: SHIPPED | OUTPATIENT
Start: 2019-04-17 | End: 2019-05-02

## 2019-04-17 RX ORDER — ATORVASTATIN CALCIUM 40 MG/1
40 TABLET, FILM COATED ORAL DAILY
Qty: 90 TABLET | Refills: 3 | Status: SHIPPED | OUTPATIENT
Start: 2019-04-17 | End: 2019-05-02

## 2019-04-17 NOTE — LETTER
2019    Homero Sanabria MD  [address] [facility] [address]    RE: Roderick Willis       Dear Colleague,    I had the pleasure of seeing Roderick Willis in the [facility].    HPI and Plan:   Today I had the pleasure of seeing Roderick Willis at [facility] in [place]. He is a pleasant 58 year old patient with a severe aortic stenosis 2/2 bicuspid aortic valve with evidence of ascending aortic dilatation s/p repair with placement of a 23 mm Magna Juárez valve along with a 30 mm Gelweave graft of the ascending aorta.   He was last seen by Dr Lane for initial consultation in 2017.    There has been some concern of patient prosthesis mismatch with the echocardiogram in 2016 showing effective orifice area of 0.75 cm and a short aortic valve acceleration time and a DVI of 0.33.  However on the most recent echocardiogram performed prior to this visit the EOA was 1.5 cm square with a DVI of 0.4 and unchanged mean gradient.    The patient is currently asymptomatic.  He reports of no chest pain, PND, orthopnea, syncope or any other cardiac related concerns.  He states that the symptoms that plagued him leading to his presentation have all since resolved.    TTE 2016: peak velocity 3.2 m/s, mean gradient: 22 mmHg, EOA: 0.75 cm2, DVI: 0.33, and acceleration time: <100 ms.  TTE 2019: Ao V2 max: 3.1 m/sec, Ao mean P.4 mmHg, EOA 1.5 cm2, DVI 0.40    ASSESSMENT:      1.  Bicuspid aortic valve, status post aortic valve replacement with a bioprosthetic valve and repair of the ascending aortic aneurysm.     2.  Possible Patient prosthesis mismatch  3.  Hypertension.   4.  Hyperlipidemia.      RECOMMENDATIONS:  The patient is doing well from a clinical standpoint.  On direct comparison of the TTE performed in 2016 and 2019, there seems to be a discrepancy between the calculated effective orifice area calculated (0.75 before and 1.5 now).   This most likely is  secondary to error in the Doppler evaluation of flow across the aortic valve.  However, the mean gradient across the valve is unchanged and is more consistent with a EOA of 1.5 cms2. Fortunately, the patient is asymptomatic. I will keep a close eye on his bioprosthetic valve by performing an echocardiogram in approximately 2 years' timeframe.  Patient is advised if he has any symptoms that are reminiscent of his presentation last year, he will contact us sooner.      Thank you for allowing me to participate in the care of Roderick Allan MD  Cardiology    Orders Placed This Encounter   Procedures     Follow-Up with Cardiologist       Orders Placed This Encounter   Medications     atorvastatin (LIPITOR) 40 MG tablet     Sig: Take 1 tablet (40 mg) by mouth daily     Dispense:  90 tablet     Refill:  3     lisinopril (PRINIVIL/ZESTRIL) 10 MG tablet     Sig: Take 1 tablet (10 mg) by mouth daily     Dispense:  90 tablet     Refill:  3     metoprolol tartrate (LOPRESSOR) 25 MG tablet     Sig: Take 0.5 tablets (12.5 mg) by mouth 2 times daily     Dispense:  90 tablet     Refill:  3       Medications Discontinued During This Encounter   Medication Reason     atorvastatin (LIPITOR) 40 MG tablet Reorder     lisinopril (PRINIVIL/ZESTRIL) 10 MG tablet Reorder     metoprolol tartrate (LOPRESSOR) 25 MG tablet Reorder       Encounter Diagnoses   Name Primary?     S/P AVR      Hyperlipidemia LDL goal <100      Coronary artery disease due to lipid rich plaque      Essential hypertension        CURRENT MEDICATIONS:  Current Outpatient Medications   Medication Sig Dispense Refill     aspirin 81 MG tablet Take 1 tablet (81 mg) by mouth daily       atorvastatin (LIPITOR) 40 MG tablet Take 1 tablet (40 mg) by mouth daily 90 tablet 3     ibuprofen 200 MG capsule Take 200 mg by mouth every 4 hours as needed for fever       lisinopril (PRINIVIL/ZESTRIL) 10 MG tablet Take 1 tablet (10 mg) by mouth daily 90 tablet 3      metFORMIN (GLUCOPHAGE) 500 MG tablet Take 1 tablet (500 mg) by mouth 2 times daily (with meals) 180 tablet 3     metoprolol tartrate (LOPRESSOR) 25 MG tablet Take 0.5 tablets (12.5 mg) by mouth 2 times daily 90 tablet 3     multivitamin, therapeutic with minerals (THERA-VIT-M) TABS Take 1 tablet by mouth daily 30 each 0     neomycin-polymyxin-hydrocortisone (CORTISPORIN) 3.5-28444-3 otic suspension Place 3 drops into both ears 3 times daily Prn. 1 Bottle 2     triamcinolone (KENALOG) 0.1 % cream Apply sparingly to affected area three times daily for 14 days. 80 g 2       ALLERGIES     Allergies   Allergen Reactions     Seasonal Allergies        PAST MEDICAL HISTORY:  Past Medical History:   Diagnosis Date     Critical aortic valve stenosis     CLARK 0.54, mean gr 85     Dyslipidemia      Hypertension        PAST SURGICAL HISTORY:  Past Surgical History:   Procedure Laterality Date     APPENDECTOMY  10/6/1988     REPAIR VALVE AORTIC N/A 3/3/2016    Procedure: REPAIR VALVE AORTIC;  Surgeon: Isaac Yin MD;  Location: [facility] OR     VASECTOMY         FAMILY HISTORY:  Family History   Problem Relation Age of Onset     Cancer Mother      Breast Cancer Mother      Heart Disease Maternal Grandmother      Respiratory Maternal Grandfather         copd     Diabetes Paternal Grandfather      Hypertension Father      Cataracts Father      Hyperlipidemia Father        SOCIAL HISTORY:  Social History     Socioeconomic History     Marital status:      Spouse name: None     Number of children: None     Years of education: None     Highest education level: None   Occupational History     None   Social Needs     Financial resource strain: None     Food insecurity:     Worry: None     Inability: None     Transportation needs:     Medical: None     Non-medical: None   Tobacco Use     Smoking status: Former Smoker     Types: Cigars     Smokeless tobacco: Never Used   Substance and Sexual Activity     Alcohol use: Yes     Comment: 12  pack beer per week     Drug use: No     Sexual activity: Yes     Partners: Female   Lifestyle     Physical activity:     Days per week: None     Minutes per session: None     Stress: None   Relationships     Social connections:     Talks on phone: None     Gets together: None     Attends Synagogue service: None     Active member of club or organization: None     Attends meetings of clubs or organizations: None     Relationship status: None     Intimate partner violence:     Fear of current or ex partner: None     Emotionally abused: None     Physically abused: None     Forced sexual activity: None   Other Topics Concern     Parent/sibling w/ CABG, MI or angioplasty before 65F 55M? No   Social History Narrative     None       Review of Systems:  Skin:  Negative       Eyes:  Positive for glasses    ENT:  Positive for tinnitus    Respiratory:  Positive for dyspnea on exertion     Cardiovascular:    fatigue;Positive for    Gastroenterology: Negative      Genitourinary:  Positive for urinary frequency;decreased urinary stream    Musculoskeletal:  Negative      Neurologic:  Positive for numbness or tingling of hands    Psychiatric:  Positive for sleep disturbances    Heme/Lymph/Imm:  Positive for allergies    Endocrine:  Negative        Physical Exam:  Vitals: /84   Pulse 94   Wt 118.8 kg (262 lb)   SpO2 94%   BMI 36.54 kg/m     GENERAL:  The patient is alert, oriented, no apparent distress.   HEENT:  Oropharynx is clear.   NECK:  Supple, no carotid bruits.   CARDIOVASCULAR: normal  heart tones, crisp S1, S2 with a 2/6 systolic ejection murmur best heard at the left upper sternal border.   LUNGS:normal b/l breath sounds on al areas.  ABDOMEN:  Obese, soft, nontender.   EXTREMITIES:  No edema.       Recent Lab Results:  LIPID RESULTS:  Lab Results   Component Value Date    CHOL 153 03/20/2019    HDL 31 (L) 03/20/2019    LDL  03/20/2019     Cannot estimate LDL when triglyceride exceeds 400 mg/dL    LDL 59  03/20/2019    TRIG 565 (H) 03/20/2019    CHOLHDLRATIO 2.9 03/04/2015       LIVER ENZYME RESULTS:  Lab Results   Component Value Date    AST 34 07/16/2016    ALT 68 07/16/2016       CBC RESULTS:  Lab Results   Component Value Date    WBC 10.7 07/16/2016    RBC 5.16 07/16/2016    HGB 13.7 07/16/2016    HCT 41.1 07/16/2016    MCV 80 07/16/2016    MCH 26.6 07/16/2016    MCHC 33.3 07/16/2016    RDW 18.6 (H) 07/16/2016     07/16/2016       BMP RESULTS:  Lab Results   Component Value Date     03/20/2019    POTASSIUM 4.1 03/20/2019    CHLORIDE 100 03/20/2019    CO2 26 03/20/2019    ANIONGAP 7 03/20/2019     (H) 03/20/2019    BUN 16 03/20/2019    CR 0.70 03/20/2019    GFRESTIMATED >90 03/20/2019    GFRESTBLACK >90 03/20/2019    NATE 8.5 03/20/2019        A1C RESULTS:  Lab Results   Component Value Date    A1C 8.8 (H) 03/20/2019       INR RESULTS:  Lab Results   Component Value Date    INR 1.26 (H) 03/04/2016    INR 1.11 03/03/2016       CC  Brett Lane MD  [address]  [place] [address]    All medical records were reviewed in detail and discussed with the patient. Greater than 35 mins were spent with the patient, 50% of this time was spent on counseling and coordination of care.  After visit summary was printed and given to the patient.    Thank you for allowing me to participate in the care of your patient.      Sincerely,     Chago Allan MD     [facility]    cc:   Brett Lane MD  [address]  [place] [address]

## 2019-04-17 NOTE — PROGRESS NOTES
HPI and Plan:   Today I had the pleasure of seeing Rdoerick Willis at [facility] in [place]. He is a pleasant 58 year old patient with a severe aortic stenosis 2/2 bicuspid aortic valve with evidence of ascending aortic dilatation s/p repair with placement of a 23 mm Magna Juárez valve along with a 30 mm Gelweave graft of the ascending aorta.   He was last seen by Dr Lane for initial consultation in 2017.    There has been some concern of patient prosthesis mismatch with the echocardiogram in 2016 showing effective orifice area of 0.75 cm and a short aortic valve acceleration time and a DVI of 0.33.  However on the most recent echocardiogram performed prior to this visit the EOA was 1.5 cm square with a DVI of 0.4 and unchanged mean gradient.    The patient is currently asymptomatic.  He reports of no chest pain, PND, orthopnea, syncope or any other cardiac related concerns.  He states that the symptoms that plagued him leading to his presentation have all since resolved.    TTE 2016: peak velocity 3.2 m/s, mean gradient: 22 mmHg, EOA: 0.75 cm2, DVI: 0.33, and acceleration time: <100 ms.  TTE 2019: Ao V2 max: 3.1 m/sec, Ao mean P.4 mmHg, EOA 1.5 cm2, DVI 0.40    ASSESSMENT:      1.  Bicuspid aortic valve, status post aortic valve replacement with a bioprosthetic valve and repair of the ascending aortic aneurysm.     2.  Possible Patient prosthesis mismatch  3.  Hypertension.   4.  Hyperlipidemia.      RECOMMENDATIONS:  The patient is doing well from a clinical standpoint.  On direct comparison of the TTE performed in 2016 and 2019, there seems to be a discrepancy between the calculated effective orifice area calculated (0.75 before and 1.5 now).   This most likely is secondary to error in the Doppler evaluation of flow across the aortic valve.  However, the mean gradient across the valve is unchanged and is more consistent with a EOA of 1.5 cms2. Fortunately, the patient is  asymptomatic. I will keep a close eye on his bioprosthetic valve by performing an echocardiogram in approximately 2 years' timeframe.  Patient is advised if he has any symptoms that are reminiscent of his presentation last year, he will contact us sooner.      Thank you for allowing me to participate in the care of Roderick Allan MD  Cardiology    Orders Placed This Encounter   Procedures     Follow-Up with Cardiologist       Orders Placed This Encounter   Medications     atorvastatin (LIPITOR) 40 MG tablet     Sig: Take 1 tablet (40 mg) by mouth daily     Dispense:  90 tablet     Refill:  3     lisinopril (PRINIVIL/ZESTRIL) 10 MG tablet     Sig: Take 1 tablet (10 mg) by mouth daily     Dispense:  90 tablet     Refill:  3     metoprolol tartrate (LOPRESSOR) 25 MG tablet     Sig: Take 0.5 tablets (12.5 mg) by mouth 2 times daily     Dispense:  90 tablet     Refill:  3       Medications Discontinued During This Encounter   Medication Reason     atorvastatin (LIPITOR) 40 MG tablet Reorder     lisinopril (PRINIVIL/ZESTRIL) 10 MG tablet Reorder     metoprolol tartrate (LOPRESSOR) 25 MG tablet Reorder       Encounter Diagnoses   Name Primary?     S/P AVR      Hyperlipidemia LDL goal <100      Coronary artery disease due to lipid rich plaque      Essential hypertension        CURRENT MEDICATIONS:  Current Outpatient Medications   Medication Sig Dispense Refill     aspirin 81 MG tablet Take 1 tablet (81 mg) by mouth daily       atorvastatin (LIPITOR) 40 MG tablet Take 1 tablet (40 mg) by mouth daily 90 tablet 3     ibuprofen 200 MG capsule Take 200 mg by mouth every 4 hours as needed for fever       lisinopril (PRINIVIL/ZESTRIL) 10 MG tablet Take 1 tablet (10 mg) by mouth daily 90 tablet 3     metFORMIN (GLUCOPHAGE) 500 MG tablet Take 1 tablet (500 mg) by mouth 2 times daily (with meals) 180 tablet 3     metoprolol tartrate (LOPRESSOR) 25 MG tablet Take 0.5 tablets (12.5 mg) by mouth 2 times daily 90  tablet 3     multivitamin, therapeutic with minerals (THERA-VIT-M) TABS Take 1 tablet by mouth daily 30 each 0     neomycin-polymyxin-hydrocortisone (CORTISPORIN) 3.5-95659-6 otic suspension Place 3 drops into both ears 3 times daily Prn. 1 Bottle 2     triamcinolone (KENALOG) 0.1 % cream Apply sparingly to affected area three times daily for 14 days. 80 g 2       ALLERGIES     Allergies   Allergen Reactions     Seasonal Allergies        PAST MEDICAL HISTORY:  Past Medical History:   Diagnosis Date     Critical aortic valve stenosis     CLARK 0.54, mean gr 85     Dyslipidemia      Hypertension        PAST SURGICAL HISTORY:  Past Surgical History:   Procedure Laterality Date     APPENDECTOMY  10/6/1988     REPAIR VALVE AORTIC N/A 3/3/2016    Procedure: REPAIR VALVE AORTIC;  Surgeon: Isaac Yin MD;  Location: [facility] OR     VASECTOMY         FAMILY HISTORY:  Family History   Problem Relation Age of Onset     Cancer Mother      Breast Cancer Mother      Heart Disease Maternal Grandmother      Respiratory Maternal Grandfather         copd     Diabetes Paternal Grandfather      Hypertension Father      Cataracts Father      Hyperlipidemia Father        SOCIAL HISTORY:  Social History     Socioeconomic History     Marital status:      Spouse name: None     Number of children: None     Years of education: None     Highest education level: None   Occupational History     None   Social Needs     Financial resource strain: None     Food insecurity:     Worry: None     Inability: None     Transportation needs:     Medical: None     Non-medical: None   Tobacco Use     Smoking status: Former Smoker     Types: Cigars     Smokeless tobacco: Never Used   Substance and Sexual Activity     Alcohol use: Yes     Comment: 12 pack beer per week     Drug use: No     Sexual activity: Yes     Partners: Female   Lifestyle     Physical activity:     Days per week: None     Minutes per session: None     Stress: None   Relationships      Social connections:     Talks on phone: None     Gets together: None     Attends Yarsanism service: None     Active member of club or organization: None     Attends meetings of clubs or organizations: None     Relationship status: None     Intimate partner violence:     Fear of current or ex partner: None     Emotionally abused: None     Physically abused: None     Forced sexual activity: None   Other Topics Concern     Parent/sibling w/ CABG, MI or angioplasty before 65F 55M? No   Social History Narrative     None       Review of Systems:  Skin:  Negative       Eyes:  Positive for glasses    ENT:  Positive for tinnitus    Respiratory:  Positive for dyspnea on exertion     Cardiovascular:    fatigue;Positive for    Gastroenterology: Negative      Genitourinary:  Positive for urinary frequency;decreased urinary stream    Musculoskeletal:  Negative      Neurologic:  Positive for numbness or tingling of hands    Psychiatric:  Positive for sleep disturbances    Heme/Lymph/Imm:  Positive for allergies    Endocrine:  Negative        Physical Exam:  Vitals: /84   Pulse 94   Wt 118.8 kg (262 lb)   SpO2 94%   BMI 36.54 kg/m    GENERAL:  The patient is alert, oriented, no apparent distress.   HEENT:  Oropharynx is clear.   NECK:  Supple, no carotid bruits.   CARDIOVASCULAR: normal  heart tones, crisp S1, S2 with a 2/6 systolic ejection murmur best heard at the left upper sternal border.   LUNGS:normal b/l breath sounds on al areas.  ABDOMEN:  Obese, soft, nontender.   EXTREMITIES:  No edema.       Recent Lab Results:  LIPID RESULTS:  Lab Results   Component Value Date    CHOL 153 03/20/2019    HDL 31 (L) 03/20/2019    LDL  03/20/2019     Cannot estimate LDL when triglyceride exceeds 400 mg/dL    LDL 59 03/20/2019    TRIG 565 (H) 03/20/2019    CHOLHDLRATIO 2.9 03/04/2015       LIVER ENZYME RESULTS:  Lab Results   Component Value Date    AST 34 07/16/2016    ALT 68 07/16/2016       CBC RESULTS:  Lab Results    Component Value Date    WBC 10.7 07/16/2016    RBC 5.16 07/16/2016    HGB 13.7 07/16/2016    HCT 41.1 07/16/2016    MCV 80 07/16/2016    MCH 26.6 07/16/2016    MCHC 33.3 07/16/2016    RDW 18.6 (H) 07/16/2016     07/16/2016       BMP RESULTS:  Lab Results   Component Value Date     03/20/2019    POTASSIUM 4.1 03/20/2019    CHLORIDE 100 03/20/2019    CO2 26 03/20/2019    ANIONGAP 7 03/20/2019     (H) 03/20/2019    BUN 16 03/20/2019    CR 0.70 03/20/2019    GFRESTIMATED >90 03/20/2019    GFRESTBLACK >90 03/20/2019    NATE 8.5 03/20/2019        A1C RESULTS:  Lab Results   Component Value Date    A1C 8.8 (H) 03/20/2019       INR RESULTS:  Lab Results   Component Value Date    INR 1.26 (H) 03/04/2016    INR 1.11 03/03/2016       CC  Brett Lane MD  [address]  [place] [address]    All medical records were reviewed in detail and discussed with the patient. Greater than 35 mins were spent with the patient, 50% of this time was spent on counseling and coordination of care.  After visit summary was printed and given to the patient.

## 2019-04-17 NOTE — LETTER
2019    Homero Sanabria MD  [address] [facility] [address]    RE: Roderick Willis       Dear Colleague,    I had the pleasure of seeing Roderick Willis in the [facility].    HPI and Plan:   Today I had the pleasure of seeing Roderick Willis at [facility] in [place]. He is a pleasant 58 year old patient with a severe aortic stenosis 2/2 bicuspid aortic valve with evidence of ascending aortic dilatation s/p repair with placement of a 23 mm Magna Juárez valve along with a 30 mm Gelweave graft of the ascending aorta.   He was last seen by Dr Lane for initial consultation in 2017.    There has been some concern of patient prosthesis mismatch with the echocardiogram in 2016 showing effective orifice area of 0.75 cm and a short aortic valve acceleration time and a DVI of 0.33.  However on the most recent echocardiogram performed prior to this visit the EOA was 1.5 cm square with a DVI of 0.4 and unchanged mean gradient.    The patient is currently asymptomatic.  He reports of no chest pain, PND, orthopnea, syncope or any other cardiac related concerns.  He states that the symptoms that plagued him leading to his presentation have all since resolved.    TTE 2016: peak velocity 3.2 m/s, mean gradient: 22 mmHg, EOA: 0.75 cm2, DVI: 0.33, and acceleration time: <100 ms.  TTE 2019: Ao V2 max: 3.1 m/sec, Ao mean P.4 mmHg, EOA 1.5 cm2, DVI 0.40    ASSESSMENT:      1.  Bicuspid aortic valve, status post aortic valve replacement with a bioprosthetic valve and repair of the ascending aortic aneurysm.     2.  Possible Patient prosthesis mismatch  3.  Hypertension.   4.  Hyperlipidemia.      RECOMMENDATIONS:  The patient is doing well from a clinical standpoint.  On direct comparison of the TTE performed in 2016 and 2019, there seems to be a discrepancy between the calculated effective orifice area calculated (0.75 before and 1.5 now).   This most likely is  secondary to error in the Doppler evaluation of flow across the aortic valve.  However, the mean gradient across the valve is unchanged and is more consistent with a EOA of 1.5 cms2. Fortunately, the patient is asymptomatic. I will keep a close eye on his bioprosthetic valve by performing an echocardiogram in approximately 2 years' timeframe.  Patient is advised if he has any symptoms that are reminiscent of his presentation last year, he will contact us sooner.      Thank you for allowing me to participate in the care of Roderick Allan MD  Cardiology    Orders Placed This Encounter   Procedures     Follow-Up with Cardiologist       Orders Placed This Encounter   Medications     atorvastatin (LIPITOR) 40 MG tablet     Sig: Take 1 tablet (40 mg) by mouth daily     Dispense:  90 tablet     Refill:  3     lisinopril (PRINIVIL/ZESTRIL) 10 MG tablet     Sig: Take 1 tablet (10 mg) by mouth daily     Dispense:  90 tablet     Refill:  3     metoprolol tartrate (LOPRESSOR) 25 MG tablet     Sig: Take 0.5 tablets (12.5 mg) by mouth 2 times daily     Dispense:  90 tablet     Refill:  3       Medications Discontinued During This Encounter   Medication Reason     atorvastatin (LIPITOR) 40 MG tablet Reorder     lisinopril (PRINIVIL/ZESTRIL) 10 MG tablet Reorder     metoprolol tartrate (LOPRESSOR) 25 MG tablet Reorder       Encounter Diagnoses   Name Primary?     S/P AVR      Hyperlipidemia LDL goal <100      Coronary artery disease due to lipid rich plaque      Essential hypertension        CURRENT MEDICATIONS:  Current Outpatient Medications   Medication Sig Dispense Refill     aspirin 81 MG tablet Take 1 tablet (81 mg) by mouth daily       atorvastatin (LIPITOR) 40 MG tablet Take 1 tablet (40 mg) by mouth daily 90 tablet 3     ibuprofen 200 MG capsule Take 200 mg by mouth every 4 hours as needed for fever       lisinopril (PRINIVIL/ZESTRIL) 10 MG tablet Take 1 tablet (10 mg) by mouth daily 90 tablet 3      metFORMIN (GLUCOPHAGE) 500 MG tablet Take 1 tablet (500 mg) by mouth 2 times daily (with meals) 180 tablet 3     metoprolol tartrate (LOPRESSOR) 25 MG tablet Take 0.5 tablets (12.5 mg) by mouth 2 times daily 90 tablet 3     multivitamin, therapeutic with minerals (THERA-VIT-M) TABS Take 1 tablet by mouth daily 30 each 0     neomycin-polymyxin-hydrocortisone (CORTISPORIN) 3.5-32341-3 otic suspension Place 3 drops into both ears 3 times daily Prn. 1 Bottle 2     triamcinolone (KENALOG) 0.1 % cream Apply sparingly to affected area three times daily for 14 days. 80 g 2       ALLERGIES     Allergies   Allergen Reactions     Seasonal Allergies        PAST MEDICAL HISTORY:  Past Medical History:   Diagnosis Date     Critical aortic valve stenosis     CLARK 0.54, mean gr 85     Dyslipidemia      Hypertension        PAST SURGICAL HISTORY:  Past Surgical History:   Procedure Laterality Date     APPENDECTOMY  10/6/1988     REPAIR VALVE AORTIC N/A 3/3/2016    Procedure: REPAIR VALVE AORTIC;  Surgeon: Isaac Yin MD;  Location: [facility] OR     VASECTOMY         FAMILY HISTORY:  Family History   Problem Relation Age of Onset     Cancer Mother      Breast Cancer Mother      Heart Disease Maternal Grandmother      Respiratory Maternal Grandfather         copd     Diabetes Paternal Grandfather      Hypertension Father      Cataracts Father      Hyperlipidemia Father        SOCIAL HISTORY:  Social History     Socioeconomic History     Marital status:      Spouse name: None     Number of children: None     Years of education: None     Highest education level: None   Occupational History     None   Social Needs     Financial resource strain: None     Food insecurity:     Worry: None     Inability: None     Transportation needs:     Medical: None     Non-medical: None   Tobacco Use     Smoking status: Former Smoker     Types: Cigars     Smokeless tobacco: Never Used   Substance and Sexual Activity     Alcohol use: Yes     Comment: 12  pack beer per week     Drug use: No     Sexual activity: Yes     Partners: Female   Lifestyle     Physical activity:     Days per week: None     Minutes per session: None     Stress: None   Relationships     Social connections:     Talks on phone: None     Gets together: None     Attends [employer] service: None     Active member of club or organization: None     Attends meetings of clubs or organizations: None     Relationship status: None     Intimate partner violence:     Fear of current or ex partner: None     Emotionally abused: None     Physically abused: None     Forced sexual activity: None   Other Topics Concern     Parent/sibling w/ CABG, MI or angioplasty before 65F 55M? No   Social History Narrative     None       Review of Systems:  Skin:  Negative       Eyes:  Positive for glasses    ENT:  Positive for tinnitus    Respiratory:  Positive for dyspnea on exertion     Cardiovascular:    fatigue;Positive for    Gastroenterology: Negative      Genitourinary:  Positive for urinary frequency;decreased urinary stream    Musculoskeletal:  Negative      Neurologic:  Positive for numbness or tingling of hands    Psychiatric:  Positive for sleep disturbances    Heme/Lymph/Imm:  Positive for allergies    Endocrine:  Negative        Physical Exam:  Vitals: /84   Pulse 94   Wt 118.8 kg (262 lb)   SpO2 94%   BMI 36.54 kg/m     GENERAL:  The patient is alert, oriented, no apparent distress.   HEENT:  Oropharynx is clear.   NECK:  Supple, no carotid bruits.   CARDIOVASCULAR: normal  heart tones, crisp S1, S2 with a 2/6 systolic ejection murmur best heard at the left upper sternal border.   LUNGS:normal b/l breath sounds on al areas.  ABDOMEN:  Obese, soft, nontender.   EXTREMITIES:  No edema.       Recent Lab Results:  LIPID RESULTS:  Lab Results   Component Value Date    CHOL 153 03/20/2019    HDL 31 (L) 03/20/2019    LDL  03/20/2019     Cannot estimate LDL when triglyceride exceeds 400 mg/dL    LDL 59  03/20/2019    TRIG 565 (H) 03/20/2019    CHOLHDLRATIO 2.9 03/04/2015       LIVER ENZYME RESULTS:  Lab Results   Component Value Date    AST 34 07/16/2016    ALT 68 07/16/2016       CBC RESULTS:  Lab Results   Component Value Date    WBC 10.7 07/16/2016    RBC 5.16 07/16/2016    HGB 13.7 07/16/2016    HCT 41.1 07/16/2016    MCV 80 07/16/2016    MCH 26.6 07/16/2016    MCHC 33.3 07/16/2016    RDW 18.6 (H) 07/16/2016     07/16/2016       BMP RESULTS:  Lab Results   Component Value Date     03/20/2019    POTASSIUM 4.1 03/20/2019    CHLORIDE 100 03/20/2019    CO2 26 03/20/2019    ANIONGAP 7 03/20/2019     (H) 03/20/2019    BUN 16 03/20/2019    CR 0.70 03/20/2019    GFRESTIMATED >90 03/20/2019    GFRESTBLACK >90 03/20/2019    NATE 8.5 03/20/2019        A1C RESULTS:  Lab Results   Component Value Date    A1C 8.8 (H) 03/20/2019       INR RESULTS:  Lab Results   Component Value Date    INR 1.26 (H) 03/04/2016    INR 1.11 03/03/2016             All medical records were reviewed in detail and discussed with the patient. Greater than 35 mins were spent with the patient, 50% of this time was spent on counseling and coordination of care.  After visit summary was printed and given to the patient.    Thank you for allowing me to participate in the care of your patient.    Sincerely,     Chago Allan MD     [facility]

## 2019-05-02 DIAGNOSIS — E78.5 HYPERLIPIDEMIA LDL GOAL <100: ICD-10-CM

## 2019-05-02 DIAGNOSIS — I25.83 CORONARY ARTERY DISEASE DUE TO LIPID RICH PLAQUE: ICD-10-CM

## 2019-05-02 DIAGNOSIS — I25.10 CORONARY ARTERY DISEASE DUE TO LIPID RICH PLAQUE: ICD-10-CM

## 2019-05-02 DIAGNOSIS — I10 ESSENTIAL HYPERTENSION: ICD-10-CM

## 2019-05-02 RX ORDER — METOPROLOL TARTRATE 25 MG/1
12.5 TABLET, FILM COATED ORAL 2 TIMES DAILY
Qty: 90 TABLET | Refills: 3 | Status: SHIPPED | OUTPATIENT
Start: 2019-05-02 | End: 2020-03-23

## 2019-05-02 RX ORDER — LISINOPRIL 10 MG/1
10 TABLET ORAL DAILY
Qty: 90 TABLET | Refills: 3 | Status: SHIPPED | OUTPATIENT
Start: 2019-05-02 | End: 2020-03-23

## 2019-05-02 RX ORDER — ATORVASTATIN CALCIUM 40 MG/1
40 TABLET, FILM COATED ORAL DAILY
Qty: 90 TABLET | Refills: 3 | Status: SHIPPED | OUTPATIENT
Start: 2019-05-02 | End: 2020-03-23

## 2019-07-15 ENCOUNTER — TELEPHONE (OUTPATIENT)
Dept: FAMILY MEDICINE | Facility: CLINIC | Age: 59
End: 2019-07-15

## 2019-07-15 DIAGNOSIS — E11.9 TYPE 2 DIABETES MELLITUS WITHOUT COMPLICATION, WITHOUT LONG-TERM CURRENT USE OF INSULIN (H): Primary | ICD-10-CM

## 2019-07-22 DIAGNOSIS — E11.9 TYPE 2 DIABETES MELLITUS WITHOUT COMPLICATION, WITHOUT LONG-TERM CURRENT USE OF INSULIN (H): ICD-10-CM

## 2019-07-22 LAB — HBA1C MFR BLD: 8.6 % (ref 0–5.6)

## 2019-07-22 PROCEDURE — 83036 HEMOGLOBIN GLYCOSYLATED A1C: CPT | Performed by: FAMILY MEDICINE

## 2019-07-22 PROCEDURE — 36415 COLL VENOUS BLD VENIPUNCTURE: CPT | Performed by: FAMILY MEDICINE

## 2019-07-23 NOTE — RESULT ENCOUNTER NOTE
Please inform patient that test result improved slightly .Please ask patient to follow up with his primary doctor.  Thank you.     Serene Cloud M.D.

## 2019-07-24 ENCOUNTER — OFFICE VISIT (OUTPATIENT)
Dept: FAMILY MEDICINE | Facility: CLINIC | Age: 59
End: 2019-07-24
Payer: COMMERCIAL

## 2019-07-24 VITALS
WEIGHT: 264 LBS | SYSTOLIC BLOOD PRESSURE: 124 MMHG | OXYGEN SATURATION: 94 % | DIASTOLIC BLOOD PRESSURE: 80 MMHG | BODY MASS INDEX: 36.96 KG/M2 | HEIGHT: 71 IN | TEMPERATURE: 98.4 F | HEART RATE: 93 BPM

## 2019-07-24 DIAGNOSIS — R53.83 FATIGUE, UNSPECIFIED TYPE: ICD-10-CM

## 2019-07-24 DIAGNOSIS — E11.9 TYPE 2 DIABETES MELLITUS WITHOUT COMPLICATION, WITHOUT LONG-TERM CURRENT USE OF INSULIN (H): Primary | ICD-10-CM

## 2019-07-24 DIAGNOSIS — N52.9 ERECTILE DYSFUNCTION, UNSPECIFIED ERECTILE DYSFUNCTION TYPE: ICD-10-CM

## 2019-07-24 DIAGNOSIS — R06.83 SNORING: ICD-10-CM

## 2019-07-24 DIAGNOSIS — Z12.11 SPECIAL SCREENING FOR MALIGNANT NEOPLASMS, COLON: ICD-10-CM

## 2019-07-24 DIAGNOSIS — E11.65 UNCONTROLLED TYPE 2 DIABETES MELLITUS WITH HYPERGLYCEMIA (H): ICD-10-CM

## 2019-07-24 PROCEDURE — 99214 OFFICE O/P EST MOD 30 MIN: CPT | Performed by: FAMILY MEDICINE

## 2019-07-24 RX ORDER — SILDENAFIL 100 MG/1
50-100 TABLET, FILM COATED ORAL DAILY PRN
Qty: 18 TABLET | Refills: 3 | Status: SHIPPED | OUTPATIENT
Start: 2019-07-24 | End: 2019-08-02

## 2019-07-24 RX ORDER — GLIPIZIDE 5 MG/1
5 TABLET, FILM COATED, EXTENDED RELEASE ORAL DAILY
Qty: 90 TABLET | Refills: 3 | Status: SHIPPED | OUTPATIENT
Start: 2019-07-24 | End: 2019-10-09 | Stop reason: DRUGHIGH

## 2019-07-24 ASSESSMENT — MIFFLIN-ST. JEOR: SCORE: 2036.69

## 2019-07-24 NOTE — PATIENT INSTRUCTIONS
Please change your medication.  When you get your glipizide medication please stop the metformin medication.  Please take the glipizide xl 5 mg once a day.    For your snoring and fatigue please get a sleep consult. I am concerned you could have sleep apnea causing your fatigue along with the medication.    Please try the Viagra medication.  Start with 50 mg and if this does not work please repeat on a different day.  If this dose does not work please go to the full 100 mg dose.    Please see the diabetic educator regarding the glucose meter and teaching.      Thank you for choosing [facility].  You may be receiving an email and/or telephone survey request from [facility] Customer Experience regarding your visit today.  Please take a few minutes to respond to the survey to let us know how we are doing.      If you have questions or concerns, please contact us via [email or web address] or you can contact your care team at [phone].    Our Clinic hours are:  Monday 6:40 am  to 7:00 pm  Tuesday -Friday 6:40 am to 5:00 pm    The [place] outpatient lab hours are:  Monday - Friday 6:10 am to 4:45 pm  Saturdays 7:00 am to 11:00 am  Appointments are required, call [phone]    If you have clinical questions after hours or would like to schedule an appointment,  call the clinic at [phone].

## 2019-07-24 NOTE — PROGRESS NOTES
Subjective     Roderick Willis is a 58 year old male who presents to clinic today for the following health issues:    HPI   Diabetes Follow-up      How often are you checking your blood sugar? Not at all    What time of day are you checking your blood sugars (select all that apply)?  N/A    Have you had any blood sugars above 200?      Have you had any blood sugars below 70?      What symptoms do you notice when your blood sugar is low?  None    What concerns do you have today about your diabetes? Other: discuss metformin side effects     Do you have any of these symptoms? (Select all that apply)  Excessive thirst     Have you had a diabetic eye exam in the last 12 months? Yes- Date of last eye exam: Feb 2019    Diabetes Management Resources    Hyperlipidemia Follow-Up      Are you having any of the following symptoms? (Select all that apply)  No complaints of shortness of breath, chest pain or pressure.  No increased sweating or nausea with activity.  No left-sided neck or arm pain.  No complaints of pain in calves when walking 1-2 blocks.    Are you regularly taking any medication or supplement to lower your cholesterol?   Yes- atorvastatin    Are you having muscle aches or other side effects that you think could be caused by your cholesterol lowering medication?  No    Hypertension Follow-up      Do you check your blood pressure regularly outside of the clinic? No     Are you following a low salt diet? Yes    Are your blood pressures ever more than 140 on the top number (systolic) OR more   than 90 on the bottom number (diastolic), for example 140/90? unknown    BP Readings from Last 2 Encounters:   07/24/19 124/80   04/17/19 143/84     Hemoglobin A1C (%)   Date Value   07/22/2019 8.6 (H)   03/20/2019 8.8 (H)     LDL Cholesterol Calculated (mg/dL)   Date Value   03/20/2019     Cannot estimate LDL when triglyceride exceeds 400 mg/dL   10/31/2018     Cannot estimate LDL when triglyceride exceeds 400 mg/dL     LDL  "Cholesterol Direct (mg/dL)   Date Value   03/20/2019 59   10/31/2018 77       Amount of exercise or physical activity: None    Problems taking medications regularly: No    Medication side effects: loose stools and more fatigue from metformin    Diet: low salt      He states metformin is causing fatigue and he wants to change.  He snores.  He has day time fatigue.  Has never seen a sleep specialist even though he has been referred in the past.  He has also ED. Would like medication.  He has a good libido.            Reviewed and updated as needed this visit by Provider         Review of Systems   ROS COMP: CONSTITUTIONAL:fatigue  INTEGUMENTARY/SKIN: NEGATIVE for worrisome rashes, moles or lesions  RESP:NEGATIVE for significant cough or SOB  CV: NEGATIVE for chest pain, palpitations or peripheral edema  GI: has gas due to metformin  MUSCULOSKELETAL: NEGATIVE for significant arthralgias or myalgia  NEURO: NEGATIVE for weakness, dizziness or paresthesias  ENDOCRINE: as above  Results for orders placed or performed in visit on 07/22/19   **A1C FUTURE anytime   Result Value Ref Range    Hemoglobin A1C 8.6 (H) 0 - 5.6 %   reviewed lab      Objective    /80 (Cuff Size: Adult Large)   Pulse 93   Temp 98.4  F (36.9  C) (Tympanic)   Ht 1.791 m (5' 10.5\")   Wt 119.7 kg (264 lb)   SpO2 94%   BMI 37.34 kg/m    Body mass index is 37.34 kg/m .  Physical Exam   GENERAL APPEARANCE: alert, no distress and cooperative  RESP: lungs clear to auscultation - no rales, rhonchi or wheezes  CV: regular rates and rhythm, normal S1 S2, no S3 or S4 and no murmur, click or rub  MS: extremities normal- no gross deformities noted  SKIN: no suspicious lesions or rashes  NEURO: Normal strength and tone, mentation intact and speech normal  DIABETIC FOOT EXAM: normal DP and PT pulses, no trophic changes or ulcerative lesions and normal sensory exam  PSYCH: mentation appears normal and affect normal/bright            Assessment & Plan " "    (E11.9) Type 2 diabetes mellitus without complication, without long-term current use of insulin (H)  (primary encounter diagnosis)  Comment: not controlled, changed to glucotrol xl  Plan: glipiZIDE (GLUCOTROL XL) 5 MG 24 hr tablet,         DIABETES EDUCATOR REFERRAL, Hemoglobin A1c            (E11.65) Uncontrolled type 2 diabetes mellitus with hyperglycemia (H)  Comment:   Plan: glipiZIDE (GLUCOTROL XL) 5 MG 24 hr tablet,         DIABETES EDUCATOR REFERRAL, Hemoglobin A1c        Follow up in 3 months with lab visit    (Z12.11) Special screening for malignant neoplasms, colon  Comment:   Plan: Fecal colorectal cancer screen (FIT)            (R53.83) Fatigue, unspecified type  Comment: get a sleep consult., already had tsh checked 4 months ago  Plan: SLEEP EVALUATION & MANAGEMENT REFERRAL - [facility]  [phone]        (Age 2 and up)            (R06.83) Snoring  Comment:   Plan: SLEEP EVALUATION & MANAGEMENT REFERRAL - [facility]  [phone]        (Age 2 and up)            (N52.9) Erectile dysfunction, unspecified erectile dysfunction type  Comment: discussed medications, side effects, how to use etc  Plan: sildenafil (VIAGRA) 100 MG tablet               BMI:   Estimated body mass index is 37.34 kg/m  as calculated from the following:    Height as of this encounter: 1.791 m (5' 10.5\").    Weight as of this encounter: 119.7 kg (264 lb).   Weight management plan: diet        Work on weight loss  See Patient Instructions    Return in about 3 months (around 10/24/2019) for Lab Work.    Homero Sanabria MD  [facility]      "

## 2019-07-25 ENCOUNTER — TELEPHONE (OUTPATIENT)
Dept: FAMILY MEDICINE | Facility: CLINIC | Age: 59
End: 2019-07-25

## 2019-07-25 NOTE — TELEPHONE ENCOUNTER
Diabetes Education Scheduling Outreach #1:    Call to patient to schedule. Left message with phone number to call to schedule.    Plan for 2nd outreach attempt within 1 week.    Pamela Bermudez  [facility]  Diabetes and Nutrition Scheduling

## 2019-08-02 ENCOUNTER — TELEPHONE (OUTPATIENT)
Dept: FAMILY MEDICINE | Facility: CLINIC | Age: 59
End: 2019-08-02

## 2019-08-02 DIAGNOSIS — N52.9 ERECTILE DYSFUNCTION, UNSPECIFIED ERECTILE DYSFUNCTION TYPE: ICD-10-CM

## 2019-08-02 RX ORDER — SILDENAFIL 100 MG/1
50-100 TABLET, FILM COATED ORAL DAILY PRN
Qty: 18 TABLET | Refills: 3 | Status: SHIPPED | OUTPATIENT
Start: 2019-08-02 | End: 2019-08-07

## 2019-08-02 NOTE — TELEPHONE ENCOUNTER
Reason for Call:  Other prescription    Detailed comments: Pt requesting Rx for Viagra be sent to local pharmacy instead of mail order, please send to [facility], no need to call pt unless there are questions.    Phone Number Patient can be reached at: Cell number on file:    Telephone Information:   Mobile [phone]       Best Time: any    Can we leave a detailed message on this number? YES    Call taken on 8/2/2019 at 9:39 AM by Adelina Camacho

## 2019-08-06 NOTE — TELEPHONE ENCOUNTER
Diabetes Education Scheduling Outreach #2:    Call to patient to schedule. Left message with phone number to call to schedule.    Pamela Bermudez  [facility]  Diabetes and Nutrition Scheduling

## 2019-08-07 RX ORDER — SILDENAFIL 100 MG/1
50-100 TABLET, FILM COATED ORAL DAILY PRN
Qty: 18 TABLET | Refills: 3 | Status: SHIPPED | OUTPATIENT
Start: 2019-08-07 | End: 2019-08-20

## 2019-08-07 NOTE — TELEPHONE ENCOUNTER
Kirill wants over $800 for this script. Pt wants a 3 month supply sent to [facility] instead. Note on script to NOT send to their specialty pharmacy part of [facility].    Ok to leave message on either line for patient.

## 2019-08-07 NOTE — TELEPHONE ENCOUNTER
Script transferred back to mail order as requested. Left message that rx was sent as requested, call back if further questions.     JACEK MembrenoN, RN

## 2019-08-09 DIAGNOSIS — Z12.11 SPECIAL SCREENING FOR MALIGNANT NEOPLASMS, COLON: ICD-10-CM

## 2019-08-09 PROCEDURE — 82274 ASSAY TEST FOR BLOOD FECAL: CPT | Performed by: FAMILY MEDICINE

## 2019-08-11 LAB — HEMOCCULT STL QL IA: NEGATIVE

## 2019-08-12 ENCOUNTER — TELEPHONE (OUTPATIENT)
Dept: FAMILY MEDICINE | Facility: CLINIC | Age: 59
End: 2019-08-12

## 2019-08-12 DIAGNOSIS — N52.9 ERECTILE DYSFUNCTION, UNSPECIFIED ERECTILE DYSFUNCTION TYPE: ICD-10-CM

## 2019-08-13 NOTE — TELEPHONE ENCOUNTER
Prior Authorization Retail Medication Request    Medication/Dose: sildenafil  ICD code (if different than what is on RX):    Previously Tried and Failed:  viagra 100 mg; flolan  Rationale:  patient has used since 7/2019    Insurance Name:  [employer]  Insurance ID:  [id number]  [facility] Key: [id number]      Pharmacy Information (if different than what is on RX)  Name:    Phone:

## 2019-08-19 NOTE — TELEPHONE ENCOUNTER
Central Prior Authorization Team   Phone: [phone]    PA Initiation    Medication: sildenafil  Insurance Company: [employer] [place] - Phone [phone] Fax [phone]  Pharmacy Filling the Rx: [id number] - [place], [place] - [address] AT [facility]  Filling Pharmacy Phone: [phone]  Filling Pharmacy Fax: [phone]  Start Date: 8/19/2019

## 2019-08-20 DIAGNOSIS — L30.9 ECZEMA, UNSPECIFIED TYPE: ICD-10-CM

## 2019-08-20 RX ORDER — SILDENAFIL 100 MG/1
50-100 TABLET, FILM COATED ORAL DAILY PRN
Qty: 18 TABLET | Refills: 3 | Status: SHIPPED | OUTPATIENT
Start: 2019-08-20 | End: 2020-09-02

## 2019-08-20 NOTE — TELEPHONE ENCOUNTER
S:  Refill request for neomycin-polymyxin-hydrocortisone ear drops    B:  LOV 7/24/19  Neomycin-polymyxin-hydrocortisone ear drops last written 4/10/18 for 3 total bottles prn supply    A:  Requested Prescriptions   Pending Prescriptions Disp Refills     neomycin-polymyxin-hydrocortisone (CORTISPORIN) 3.5-12731-1 otic suspension 1 Bottle 2     Sig: Place 3 drops into both ears 3 times daily Prn.       There is no refill protocol information for this order        There is no [facility] refill protocol for this medication    R:  Routed to provider:  Can patient have refill of medication as pended?    Angel Vasquez RN

## 2019-08-20 NOTE — TELEPHONE ENCOUNTER
Reason for Call:  Prescription Issue    Detailed comments: Pt states that he heard from his insurance company that his Sildenafil Rx will not be covered and P.A. was denied.  He is asking that we print, sign and mail Rx to his home address so that he can get Rx online himself.      Phone Number Patient can be reached at: Home number on file [phone] (home)    Best Time: any    Can we leave a detailed message on this number? YES    Call taken on 8/20/2019 at 8:57 AM by Shyann Muse

## 2019-08-20 NOTE — TELEPHONE ENCOUNTER
Dr. Sanabria,    Patient would like a hard copy of the Rx for viagra.  I have pended for your convenience.. Radha REYNOLDS RN

## 2019-08-20 NOTE — TELEPHONE ENCOUNTER
PRIOR AUTHORIZATION DENIED    Medication: sildenafil-DENIED    Denial Date: 8/19/2019    Denial Rational: MEDICATION EXCLUDED FROM COVERAGE.        Appeal Information: N/A

## 2019-08-20 NOTE — TELEPHONE ENCOUNTER
Reason for Call:  Medication or medication refill:    Do you use a [facility]?  Name of the pharmacy and phone number for the current request:  [facility] [phone]    Name of the medication requested: Ear drops - Pt called for refill.  No need to call patient back, unless there are questions or problems.      Ear Drops  Last Written Prescription Date:  4/10/18  Last Fill Quantity: 1,  # refills: 2   Last office visit: 7/24/2019 with prescribing provider:     Future Office Visit:      Other request:     Can we leave a detailed message on this number? YES    Phone number patient can be reached at: Home number on file [phone] (home)    Best Time: any    Call taken on 8/20/2019 at 9:18 AM by Shyann Muse

## 2019-08-21 RX ORDER — NEOMYCIN SULFATE, POLYMYXIN B SULFATE AND HYDROCORTISONE 10; 3.5; 1 MG/ML; MG/ML; [USP'U]/ML
3 SUSPENSION/ DROPS AURICULAR (OTIC) 3 TIMES DAILY
Qty: 1 BOTTLE | Refills: 1 | Status: SHIPPED | OUTPATIENT
Start: 2019-08-21 | End: 2020-03-26

## 2019-09-25 ENCOUNTER — TELEPHONE (OUTPATIENT)
Dept: FAMILY MEDICINE | Facility: CLINIC | Age: 59
End: 2019-09-25

## 2019-09-25 NOTE — TELEPHONE ENCOUNTER
Panel Management Review    Summary:  Chief Complaint   Patient presents with     Panel Management     Needs appt with Dr. Sanabria for diabetes management       Patient is due/failing the following:   A1C    Action needed:   Patient needs office visit for diabetes management.    Type of outreach:    Phone, left message for patient to call back.     Questions for provider review:    None                                                                                                                                    JOSHUA Wells ([facility])     Chart routed to Care Team .  Will postpone until Oct 3 to see if he made appt

## 2019-10-03 NOTE — TELEPHONE ENCOUNTER
Left message on answer machine to return call to clinic and make an appt to see Dr. Sanabria.  KESHIA Wells-C ([facility])

## 2019-10-08 DIAGNOSIS — E11.9 TYPE 2 DIABETES MELLITUS WITHOUT COMPLICATION, WITHOUT LONG-TERM CURRENT USE OF INSULIN (H): Primary | ICD-10-CM

## 2019-10-09 DIAGNOSIS — E11.9 TYPE 2 DIABETES MELLITUS WITHOUT COMPLICATION, WITHOUT LONG-TERM CURRENT USE OF INSULIN (H): ICD-10-CM

## 2019-10-09 DIAGNOSIS — E11.65 UNCONTROLLED TYPE 2 DIABETES MELLITUS WITH HYPERGLYCEMIA (H): ICD-10-CM

## 2019-10-09 DIAGNOSIS — E11.9 TYPE 2 DIABETES MELLITUS WITHOUT COMPLICATION, WITHOUT LONG-TERM CURRENT USE OF INSULIN (H): Primary | ICD-10-CM

## 2019-10-09 LAB — HBA1C MFR BLD: 8.2 % (ref 0–5.6)

## 2019-10-09 PROCEDURE — 36415 COLL VENOUS BLD VENIPUNCTURE: CPT | Performed by: FAMILY MEDICINE

## 2019-10-09 PROCEDURE — 83036 HEMOGLOBIN GLYCOSYLATED A1C: CPT | Performed by: FAMILY MEDICINE

## 2019-10-09 RX ORDER — GLIPIZIDE 10 MG/1
10 TABLET, FILM COATED, EXTENDED RELEASE ORAL DAILY
Qty: 90 TABLET | Refills: 3 | Status: SHIPPED | OUTPATIENT
Start: 2019-10-09 | End: 2020-03-23

## 2019-11-03 ENCOUNTER — HEALTH MAINTENANCE LETTER (OUTPATIENT)
Age: 59
End: 2019-11-03

## 2020-02-11 ENCOUNTER — TELEPHONE (OUTPATIENT)
Dept: FAMILY MEDICINE | Facility: CLINIC | Age: 60
End: 2020-02-11

## 2020-02-11 DIAGNOSIS — E11.65 UNCONTROLLED TYPE 2 DIABETES MELLITUS WITH HYPERGLYCEMIA (H): Primary | ICD-10-CM

## 2020-02-11 NOTE — TELEPHONE ENCOUNTER
Panel Management Review    Summary:  Chief Complaint   Patient presents with     Panel Management     Need appt for diabetes mgmt and labs. A1C elevated 8.2 Fasting labs are due.     Patient is due/failing the following:   A1C    Action needed:   Patient needs office visit for diabetes management. Should do fasting labs prior to being seen.    Type of outreach:    Sent letter.    Questions for provider review:    None                                                                                                                                    JOSHUA Wells ([facility])

## 2020-02-11 NOTE — LETTER
[facility]  [address] [facility] [address]  Phone: [phone]      February 11, 2020    Rc Eggert  [address] [facility] [address]      Dear Roderick,    Living with diabetes is hard work every day.  We recognize this. Our efforts are to help you reach and maintain the following goals:    HbA1c completed 1 time per year.  HbA1c level of less than 8.0  LDL (bad cholesterol) completed yearly  LDL level less than 100  Microalbumin completed yearly, with a level less than 25.  Maintain a blood pressure of less than 130/80  Yearly eye exam.  Continue to be tobacco free    Your most recent blood tests on record are listed below for your review:    Lab Results   Component Value Date    CHOL 153 03/20/2019      Lab Results   Component Value Date    HDL 31 03/20/2019       Lab Results   Component Value Date    LDL  03/20/2019     Cannot estimate LDL when triglyceride exceeds 400 mg/dL    LDL 59 03/20/2019       Lab Results   Component Value Date    TRIG 565 03/20/2019       No results found for: MICROALBUMIN    Lab Results   Component Value Date    A1C 8.2 10/09/2019      BP Readings from Last 1 Encounters:   07/24/19 124/80         We want to work with you to help you achieve the best possible health outcomes.  Patients with diabetes should generally see their physician every 3 months. The physician may choose to extend this time out to 6 months in patients who are stable and are reaching the goals outlined above.     Please give us a call at [phone] to make an appointment for diabetes management with Dr. Sanabria.  You could also do a fasting laboratory appointment prior to seeing Dr. Sanabria.      Sincerely,      JOSHUA Wells ([facility]) for  Homero Sanabria MD

## 2020-03-04 ENCOUNTER — TELEPHONE (OUTPATIENT)
Dept: FAMILY MEDICINE | Facility: CLINIC | Age: 60
End: 2020-03-04

## 2020-03-04 NOTE — TELEPHONE ENCOUNTER
Panel Management Review      Summary:  Chief Complaint   Patient presents with     Panel Management     Calling patient to follow up reminder letter. He needs diabetes appt with .       Patient is due/failing the following:   A1C    Action needed:   Patient needs office visit for diabetes management. Labs done prior    Type of outreach:    Phone, spoke to patient.  Made lab appt for March 24th at 9:05 and Dr. Sanabria appt on March 25th at 8:00    Questions for provider review:    None                                                                                                                                    JOSHUA Wells ([facility])

## 2020-03-23 ENCOUNTER — TELEPHONE (OUTPATIENT)
Dept: FAMILY MEDICINE | Facility: CLINIC | Age: 60
End: 2020-03-23

## 2020-03-23 DIAGNOSIS — N52.9 ERECTILE DYSFUNCTION, UNSPECIFIED ERECTILE DYSFUNCTION TYPE: ICD-10-CM

## 2020-03-23 DIAGNOSIS — I25.83 CORONARY ARTERY DISEASE DUE TO LIPID RICH PLAQUE: ICD-10-CM

## 2020-03-23 DIAGNOSIS — I10 ESSENTIAL HYPERTENSION: ICD-10-CM

## 2020-03-23 DIAGNOSIS — E11.9 TYPE 2 DIABETES MELLITUS WITHOUT COMPLICATION, WITHOUT LONG-TERM CURRENT USE OF INSULIN (H): ICD-10-CM

## 2020-03-23 DIAGNOSIS — I25.10 CORONARY ARTERY DISEASE DUE TO LIPID RICH PLAQUE: ICD-10-CM

## 2020-03-23 DIAGNOSIS — E78.5 HYPERLIPIDEMIA LDL GOAL <100: ICD-10-CM

## 2020-03-23 RX ORDER — METOPROLOL TARTRATE 25 MG/1
12.5 TABLET, FILM COATED ORAL 2 TIMES DAILY
Qty: 90 TABLET | Refills: 0 | Status: SHIPPED | OUTPATIENT
Start: 2020-03-23 | End: 2020-06-15

## 2020-03-23 RX ORDER — LISINOPRIL 10 MG/1
10 TABLET ORAL DAILY
Qty: 90 TABLET | Refills: 0 | Status: SHIPPED | OUTPATIENT
Start: 2020-03-23 | End: 2020-06-15

## 2020-03-23 RX ORDER — ATORVASTATIN CALCIUM 40 MG/1
40 TABLET, FILM COATED ORAL DAILY
Qty: 90 TABLET | Refills: 0 | Status: SHIPPED | OUTPATIENT
Start: 2020-03-23 | End: 2020-06-15

## 2020-03-23 RX ORDER — GLIPIZIDE 10 MG/1
10 TABLET, FILM COATED, EXTENDED RELEASE ORAL DAILY
Qty: 90 TABLET | Refills: 0 | Status: SHIPPED | OUTPATIENT
Start: 2020-03-23 | End: 2020-06-12

## 2020-03-23 NOTE — TELEPHONE ENCOUNTER
I recommend to defer 3 months and I sent 3 months supply of his regular medications.  Homero Sanabria MD  Family Medicine

## 2020-03-23 NOTE — TELEPHONE ENCOUNTER
Attempted to contact patient, no answer, left voice message to call back.      [facility] [place] okay to deliver message.

## 2020-03-23 NOTE — TELEPHONE ENCOUNTER
Reason for call:  Patient reporting a symptom    Symptom or request: Pt has an appt 3/25 with Dr. Sanabria and a lab appt 3/24.  Pt wants to know if he should keep appts or can her get Rx refills over the phone?  Please call patient and advise.      Duration (how long have symptoms been present): ongoing    Have you been treated for this before? Yes    Additional comments:     Phone Number patient can be reached at:  Home number on file [phone] (home)    Best Time:  any    Can we leave a detailed message on this number:  YES    Call taken on 3/23/2020 at 8:52 AM by Shyann Muse

## 2020-03-25 DIAGNOSIS — L30.9 ECZEMA, UNSPECIFIED TYPE: ICD-10-CM

## 2020-03-25 NOTE — TELEPHONE ENCOUNTER
Requested Prescriptions   Pending Prescriptions Disp Refills     neomycin-polymyxin-hydrocortisone (CORTISPORIN) 3.5-99559-2 otic suspension 1 Bottle 1     Sig: Place 3 drops into both ears 3 times daily Prn.       There is no refill protocol information for this order        Radha Mccartney  [facility]

## 2020-03-26 RX ORDER — NEOMYCIN SULFATE, POLYMYXIN B SULFATE AND HYDROCORTISONE 10; 3.5; 1 MG/ML; MG/ML; [USP'U]/ML
3 SUSPENSION/ DROPS AURICULAR (OTIC) 3 TIMES DAILY
Qty: 1 BOTTLE | Refills: 0 | Status: SHIPPED | OUTPATIENT
Start: 2020-03-26 | End: 2020-08-15

## 2020-06-08 DIAGNOSIS — E11.65 UNCONTROLLED TYPE 2 DIABETES MELLITUS WITH HYPERGLYCEMIA (H): Primary | ICD-10-CM

## 2020-06-08 DIAGNOSIS — I25.83 CORONARY ARTERY DISEASE DUE TO LIPID RICH PLAQUE: ICD-10-CM

## 2020-06-08 DIAGNOSIS — E11.9 TYPE 2 DIABETES MELLITUS WITHOUT COMPLICATION, WITHOUT LONG-TERM CURRENT USE OF INSULIN (H): ICD-10-CM

## 2020-06-08 DIAGNOSIS — I25.10 CORONARY ARTERY DISEASE DUE TO LIPID RICH PLAQUE: ICD-10-CM

## 2020-06-08 DIAGNOSIS — Z12.5 SCREENING FOR PROSTATE CANCER: ICD-10-CM

## 2020-06-08 DIAGNOSIS — I10 ESSENTIAL HYPERTENSION: ICD-10-CM

## 2020-06-08 NOTE — LETTER
[facility]  [address] [facility] [address]  Phone: [phone]       June 12, 2020         Roderick Willis  [address] [place] [id number]            Dear Roderick:    We are concerned about your health care.  We recently provided you with medication refills.  Many medications require routine follow-up with your doctor and routine labs.     At this time, you are due for fasting labs and a visit. Please contact the clinic to schedule a Lab and a virtual visit after.      Your prescription(s) have been refilled for 90 days so you may have time for the above noted follow-up. Please call to schedule soon so we can assure you have an appointment before your next refills are needed.    Thank you,      Homero Sanabria MD / guy

## 2020-06-09 NOTE — TELEPHONE ENCOUNTER
"Requested Prescriptions   Pending Prescriptions Disp Refills     glipiZIDE (GLUCOTROL XL) 10 MG 24 hr tablet [Pharmacy Med Name: GLIPIZIDE ER 10MG TABLETS] 90 tablet 0     Sig: TAKE 1 TABLET BY MOUTH EVERY DAY       Sulfonylurea Agents Failed - 6/8/2020  8:51 AM        Failed - Patient has documented A1c within the specified period of time.     If HgbA1C is 8 or greater, it needs to be on file within the past 3 months.  If less than 8, must be on file within the past 6 months.     Recent Labs   Lab Test 10/09/19  1031   A1C 8.2*             Failed - Patient has a recent creatinine (normal) within the past 12 mos.     Recent Labs   Lab Test 03/20/19  0825   CR 0.70       Ok to refill medication if creatinine is low          Failed - Recent (6 mo) or future (30 days) visit within the authorizing provider's specialty     Patient had office visit in the last 6 months or has a visit in the next 30 days with authorizing provider or within the authorizing provider's specialty.  See \"Patient Info\" tab in inbasket, or \"Choose Columns\" in Meds & Orders section of the refill encounter.            Passed - Medication is active on med list        Passed - Patient is age 18 or older             "

## 2020-06-12 RX ORDER — GLIPIZIDE 10 MG/1
TABLET, FILM COATED, EXTENDED RELEASE ORAL
Qty: 90 TABLET | Refills: 0 | Status: SHIPPED | OUTPATIENT
Start: 2020-06-12 | End: 2020-08-31

## 2020-06-12 NOTE — TELEPHONE ENCOUNTER
Refilled once.  Please make a fasting lab visit and follow up with me in clinic for refills.  Homero Sanabria MD  Family Medicine

## 2020-06-15 DIAGNOSIS — I25.83 CORONARY ARTERY DISEASE DUE TO LIPID RICH PLAQUE: ICD-10-CM

## 2020-06-15 DIAGNOSIS — I25.10 CORONARY ARTERY DISEASE DUE TO LIPID RICH PLAQUE: ICD-10-CM

## 2020-06-15 DIAGNOSIS — E78.5 HYPERLIPIDEMIA LDL GOAL <100: ICD-10-CM

## 2020-06-15 DIAGNOSIS — I10 ESSENTIAL HYPERTENSION: ICD-10-CM

## 2020-06-15 DIAGNOSIS — E11.9 TYPE 2 DIABETES MELLITUS WITHOUT COMPLICATION, WITHOUT LONG-TERM CURRENT USE OF INSULIN (H): ICD-10-CM

## 2020-06-15 RX ORDER — ATORVASTATIN CALCIUM 40 MG/1
TABLET, FILM COATED ORAL
Qty: 90 TABLET | Refills: 0 | Status: SHIPPED | OUTPATIENT
Start: 2020-06-15 | End: 2020-09-02

## 2020-06-15 RX ORDER — METOPROLOL TARTRATE 25 MG/1
12.5 TABLET, FILM COATED ORAL 2 TIMES DAILY
Qty: 90 TABLET | Refills: 0 | Status: SHIPPED | OUTPATIENT
Start: 2020-06-15 | End: 2020-09-02

## 2020-06-15 RX ORDER — GLIPIZIDE 10 MG/1
TABLET, FILM COATED, EXTENDED RELEASE ORAL
Qty: 90 TABLET | Refills: 0 | OUTPATIENT
Start: 2020-06-15

## 2020-06-15 RX ORDER — LISINOPRIL 10 MG/1
10 TABLET ORAL DAILY
Qty: 90 TABLET | Refills: 0 | Status: SHIPPED | OUTPATIENT
Start: 2020-06-15 | End: 2020-09-02

## 2020-06-24 DIAGNOSIS — I10 ESSENTIAL HYPERTENSION: ICD-10-CM

## 2020-06-24 DIAGNOSIS — I25.10 CORONARY ARTERY DISEASE DUE TO LIPID RICH PLAQUE: ICD-10-CM

## 2020-06-24 DIAGNOSIS — E11.9 TYPE 2 DIABETES MELLITUS WITHOUT COMPLICATION, WITHOUT LONG-TERM CURRENT USE OF INSULIN (H): ICD-10-CM

## 2020-06-24 DIAGNOSIS — E11.65 UNCONTROLLED TYPE 2 DIABETES MELLITUS WITH HYPERGLYCEMIA (H): ICD-10-CM

## 2020-06-24 DIAGNOSIS — Z12.5 SCREENING FOR PROSTATE CANCER: ICD-10-CM

## 2020-06-24 DIAGNOSIS — I25.83 CORONARY ARTERY DISEASE DUE TO LIPID RICH PLAQUE: ICD-10-CM

## 2020-06-24 LAB
ANION GAP SERPL CALCULATED.3IONS-SCNC: 5 MMOL/L (ref 3–14)
BUN SERPL-MCNC: 18 MG/DL (ref 7–30)
CALCIUM SERPL-MCNC: 9.2 MG/DL (ref 8.5–10.1)
CHLORIDE SERPL-SCNC: 101 MMOL/L (ref 94–109)
CHOLEST SERPL-MCNC: 169 MG/DL
CO2 SERPL-SCNC: 28 MMOL/L (ref 20–32)
CREAT SERPL-MCNC: 0.79 MG/DL (ref 0.66–1.25)
CREAT UR-MCNC: 92 MG/DL
GFR SERPL CREATININE-BSD FRML MDRD: >90 ML/MIN/{1.73_M2}
GLUCOSE SERPL-MCNC: 206 MG/DL (ref 70–99)
HBA1C MFR BLD: 7.5 % (ref 0–5.6)
HDLC SERPL-MCNC: 41 MG/DL
LDLC SERPL CALC-MCNC: ABNORMAL MG/DL
LDLC SERPL DIRECT ASSAY-MCNC: 73 MG/DL
MICROALBUMIN UR-MCNC: 13 MG/L
MICROALBUMIN/CREAT UR: 13.86 MG/G CR (ref 0–17)
NONHDLC SERPL-MCNC: 128 MG/DL
POTASSIUM SERPL-SCNC: 4.1 MMOL/L (ref 3.4–5.3)
PSA SERPL-ACNC: 0.68 UG/L (ref 0–4)
SODIUM SERPL-SCNC: 134 MMOL/L (ref 133–144)
TRIGL SERPL-MCNC: 571 MG/DL

## 2020-06-24 PROCEDURE — 83036 HEMOGLOBIN GLYCOSYLATED A1C: CPT | Performed by: FAMILY MEDICINE

## 2020-06-24 PROCEDURE — 82043 UR ALBUMIN QUANTITATIVE: CPT | Performed by: FAMILY MEDICINE

## 2020-06-24 PROCEDURE — 83721 ASSAY OF BLOOD LIPOPROTEIN: CPT | Performed by: FAMILY MEDICINE

## 2020-06-24 PROCEDURE — 36415 COLL VENOUS BLD VENIPUNCTURE: CPT | Performed by: FAMILY MEDICINE

## 2020-06-24 PROCEDURE — 80061 LIPID PANEL: CPT | Performed by: FAMILY MEDICINE

## 2020-06-24 PROCEDURE — 80048 BASIC METABOLIC PNL TOTAL CA: CPT | Performed by: FAMILY MEDICINE

## 2020-06-24 PROCEDURE — G0103 PSA SCREENING: HCPCS | Performed by: FAMILY MEDICINE

## 2020-08-14 DIAGNOSIS — L30.9 ECZEMA, UNSPECIFIED TYPE: ICD-10-CM

## 2020-08-14 NOTE — TELEPHONE ENCOUNTER
Requested Prescriptions   Pending Prescriptions Disp Refills     neomycin-polymyxin-hydrocortisone (CORTISPORIN) 3.5-50783-5 otic suspension 1 Bottle 0     Sig: Place 3 drops into both ears 3 times daily Prn.       There is no refill protocol information for this order        Routing refill request to provider for review/approval because:  Drug not on the [facility] refill protocol

## 2020-08-14 NOTE — TELEPHONE ENCOUNTER
Patient is calling again today for this to be filled. Please expedite today.  [place] Mckinney  [facility]

## 2020-08-15 RX ORDER — NEOMYCIN SULFATE, POLYMYXIN B SULFATE AND HYDROCORTISONE 10; 3.5; 1 MG/ML; MG/ML; [USP'U]/ML
3 SUSPENSION/ DROPS AURICULAR (OTIC) 3 TIMES DAILY
Qty: 1 BOTTLE | Refills: 1 | Status: SHIPPED | OUTPATIENT
Start: 2020-08-15 | End: 2022-03-16

## 2020-08-28 DIAGNOSIS — E11.9 TYPE 2 DIABETES MELLITUS WITHOUT COMPLICATION, WITHOUT LONG-TERM CURRENT USE OF INSULIN (H): ICD-10-CM

## 2020-08-31 RX ORDER — GLIPIZIDE 10 MG/1
TABLET, FILM COATED, EXTENDED RELEASE ORAL
Qty: 90 TABLET | Refills: 0 | Status: SHIPPED | OUTPATIENT
Start: 2020-08-31 | End: 2020-09-02

## 2020-08-31 NOTE — TELEPHONE ENCOUNTER
"Called and advised he is due for a visit. \"ok, will do. \" \"I did labs in June, and you wouldn't let me come in then\"   Offered a virtual diabetes follow up and he says \"I needed to see him anyway, I can come in. \"    transferred him to the appt desk and advised one refill until he can be seen will be sent in.   Per protocol.     Kady Rosa RNC    "

## 2020-09-02 ENCOUNTER — VIRTUAL VISIT (OUTPATIENT)
Dept: FAMILY MEDICINE | Facility: CLINIC | Age: 60
End: 2020-09-02
Payer: COMMERCIAL

## 2020-09-02 DIAGNOSIS — I25.10 CORONARY ARTERY DISEASE DUE TO LIPID RICH PLAQUE: ICD-10-CM

## 2020-09-02 DIAGNOSIS — E78.5 HYPERLIPIDEMIA LDL GOAL <100: Primary | ICD-10-CM

## 2020-09-02 DIAGNOSIS — H92.13 EAR DRAINAGE, BILATERAL: ICD-10-CM

## 2020-09-02 DIAGNOSIS — E11.9 TYPE 2 DIABETES MELLITUS WITHOUT COMPLICATION, WITHOUT LONG-TERM CURRENT USE OF INSULIN (H): ICD-10-CM

## 2020-09-02 DIAGNOSIS — I10 ESSENTIAL HYPERTENSION: ICD-10-CM

## 2020-09-02 DIAGNOSIS — N52.9 ERECTILE DYSFUNCTION, UNSPECIFIED ERECTILE DYSFUNCTION TYPE: ICD-10-CM

## 2020-09-02 DIAGNOSIS — I25.83 CORONARY ARTERY DISEASE DUE TO LIPID RICH PLAQUE: ICD-10-CM

## 2020-09-02 DIAGNOSIS — E78.1 HYPERTRIGLYCERIDEMIA: ICD-10-CM

## 2020-09-02 PROCEDURE — 99214 OFFICE O/P EST MOD 30 MIN: CPT | Mod: 95 | Performed by: FAMILY MEDICINE

## 2020-09-02 RX ORDER — GLIPIZIDE 10 MG/1
10 TABLET, FILM COATED, EXTENDED RELEASE ORAL DAILY
Qty: 90 TABLET | Refills: 3 | Status: SHIPPED | OUTPATIENT
Start: 2020-09-02 | End: 2020-12-16 | Stop reason: DRUGHIGH

## 2020-09-02 RX ORDER — TADALAFIL 20 MG/1
20 TABLET ORAL DAILY PRN
Qty: 6 TABLET | Refills: 11 | Status: SHIPPED | OUTPATIENT
Start: 2020-09-02

## 2020-09-02 RX ORDER — LISINOPRIL 10 MG/1
10 TABLET ORAL DAILY
Qty: 90 TABLET | Refills: 3 | Status: SHIPPED | OUTPATIENT
Start: 2020-09-02 | End: 2021-08-17

## 2020-09-02 RX ORDER — ATORVASTATIN CALCIUM 40 MG/1
40 TABLET, FILM COATED ORAL DAILY
Qty: 90 TABLET | Refills: 3 | Status: SHIPPED | OUTPATIENT
Start: 2020-09-02 | End: 2020-09-10

## 2020-09-02 RX ORDER — METOPROLOL TARTRATE 25 MG/1
12.5 TABLET, FILM COATED ORAL 2 TIMES DAILY
Qty: 90 TABLET | Refills: 3 | Status: SHIPPED | OUTPATIENT
Start: 2020-09-02 | End: 2021-08-17

## 2020-09-02 NOTE — PROGRESS NOTES
"Roderick Willis is a 60 year old male who is being evaluated via a billable video visit.      The patient has been notified of following:     \"This video visit will be conducted via a call between you and your physician/provider. We have found that certain health care needs can be provided without the need for an in-person physical exam.  This service lets us provide the care you need with a video conversation.  If a prescription is necessary we can send it directly to your pharmacy.  If lab work is needed we can place an order for that and you can then stop by our lab to have the test done at a later time.    Video visits are billed at different rates depending on your insurance coverage.  Please reach out to your insurance provider with any questions.    If during the course of the call the physician/provider feels a video visit is not appropriate, you will not be charged for this service.\"    Patient has given verbal consent for Video visit? Yes  How would you like to obtain your AVS? MyChart  If you are dropped from the video visit, the video invite should be resent to: Send to e-mail at: [email or web address]  Will anyone else be joining your video visit? No    Subjective     Roderick Willis is a 60 year old male who presents today via video visit for the following health issues:    HPI    Diabetes Follow-up      How often are you checking your blood sugar? Not at all    What concerns do you have today about your diabetes? None     Do you have any of these symptoms? (Select all that apply)  Numbness in feet    Have you had a diabetic eye exam in the last 12 months? No                Hyperlipidemia Follow-Up      Are you regularly taking any medication or supplement to lower your cholesterol?   Yes- atorvastatin    Are you having muscle aches or other side effects that you think could be caused by your cholesterol lowering medication?  No    Hypertension Follow-up      Do you check your blood pressure regularly " outside of the clinic? No     Are you following a low salt diet? Yes    Are your blood pressures ever more than 140 on the top number (systolic) OR more   than 90 on the bottom number (diastolic), for example 140/90? No    BP Readings from Last 2 Encounters:   07/24/19 124/80   04/17/19 143/84     Hemoglobin A1C (%)   Date Value   06/24/2020 7.5 (H)   10/09/2019 8.2 (H)     LDL Cholesterol Calculated (mg/dL)   Date Value   06/24/2020     Cannot estimate LDL when triglyceride exceeds 400 mg/dL   03/20/2019     Cannot estimate LDL when triglyceride exceeds 400 mg/dL     LDL Cholesterol Direct (mg/dL)   Date Value   06/24/2020 73   03/20/2019 59         How many servings of fruits and vegetables do you eat daily?  2-3    On average, how many sweetened beverages do you drink each day (Examples: soda, juice, sweet tea, etc.  Do NOT count diet or artificially sweetened beverages)?   1-2/week    How many days per week do you exercise enough to make your heart beat faster? 3 or less    How many minutes a day do you exercise enough to make your heart beat faster? 9 or less    How many days per week do you miss taking your medication? Few times a month if at all.          Video Start Time: 1003        Review of Systems   Review Of Systems  Skin: still using steroid cream intermittently works well  Eyes:   Ears/Nose/Throat: having ear drainage, not clearing. Discussed getting ENT consult. H/o of itchy ears, drop usually work, not this time  Respiratory: No shortness of breath, dyspnea on exertion, cough, or hemoptysis  Cardiovascular: negative  Gastrointestinal: negative  Genitourinary: as above, want to try a different ED medication  Musculoskeletal:   Neurologic:   Psychiatric:   Hematologic/Lymphatic/Immunologic:   Endocrine: DM has been controlled        Objective           Vitals:  No vitals were obtained today due to virtual visit.    Physical Exam     GENERAL: Healthy, alert and no distress  EYES: Eyes grossly normal to  inspection.  No discharge or erythema, or obvious scleral/conjunctival abnormalities.  RESP: No audible wheeze, cough, or visible cyanosis.  No visible retractions or increased work of breathing.    SKIN: Visible skin clear. No significant rash, abnormal pigmentation or lesions.  NEURO: Cranial nerves grossly intact.  Mentation and speech appropriate for age.  PSYCH: Mentation appears normal, affect normal/bright, judgement and insight intact, normal speech and appearance well-groomed.      Reviewed his labs        Assessment & Plan     Hyperlipidemia LDL goal <100  Refilled med, recheck lab after diet changes and fish supplement oil  - Lipid panel reflex to direct LDL Fasting; Future  - atorvastatin (LIPITOR) 40 MG tablet; Take 1 tablet (40 mg) by mouth daily    Hypertriglyceridemia  Will recheck  - Lipid panel reflex to direct LDL Fasting; Future    Erectile dysfunction, unspecified erectile dysfunction type  Changed med  - tadalafil (CIALIS) 20 MG tablet; Take 1 tablet (20 mg) by mouth daily as needed (ED)    Coronary artery disease due to lipid rich plaque  Stable and refilled med, get bp check  - atorvastatin (LIPITOR) 40 MG tablet; Take 1 tablet (40 mg) by mouth daily  - metoprolol tartrate (LOPRESSOR) 25 MG tablet; Take 0.5 tablets (12.5 mg) by mouth 2 times daily    Type 2 diabetes mellitus without complication, without long-term current use of insulin (H)  Refilled med  - glipiZIDE (GLUCOTROL XL) 10 MG 24 hr tablet; Take 1 tablet (10 mg) by mouth daily    Essential hypertension  Need to get bp check  - lisinopril (ZESTRIL) 10 MG tablet; Take 1 tablet (10 mg) by mouth daily  - metoprolol tartrate (LOPRESSOR) 25 MG tablet; Take 0.5 tablets (12.5 mg) by mouth 2 times daily    Ear drainage, bilateral  Referral is done  - OTOLARYNGOLOGY REFERRAL       See Patient Instructions    Return in about 22 days (around 9/24/2020) for Nurse visit for a blood pressure check, Lab Work, fasting lab work.    Homero MARTINEZ  MD Daniele  [facility]      Video-Visit Details    Type of service:  Video Visit    Video End Time:1019    Originating Location (pt. Location): Home    Distant Location (provider location):  [facility]     Platform used for Video Visit: "[employer]"

## 2020-09-02 NOTE — PATIENT INSTRUCTIONS
Component      Latest Ref Rng & Units 6/24/2020   Sodium      133 - 144 mmol/L 134   Potassium      3.4 - 5.3 mmol/L 4.1   Chloride      94 - 109 mmol/L 101   Carbon Dioxide      20 - 32 mmol/L 28   Anion Gap      3 - 14 mmol/L 5   Glucose      70 - 99 mg/dL 206 (H)   Urea Nitrogen      7 - 30 mg/dL 18   Creatinine      0.66 - 1.25 mg/dL 0.79   GFR Estimate      >60 mL/min/1.73:m2 >90   GFR Estimate If Black      >60 mL/min/1.73:m2 >90   Calcium      8.5 - 10.1 mg/dL 9.2   Cholesterol      <200 mg/dL 169   Triglycerides      <150 mg/dL 571 (H)   HDL Cholesterol      >39 mg/dL 41   LDL Cholesterol Calculated      <100 mg/dL Cannot estimate LDL when triglyceride exceeds 400 mg/dL   Non HDL Cholesterol      <130 mg/dL 128   Creatinine Urine      mg/dL 92   Albumin Urine mg/L      mg/L 13   Albumin Urine mg/g Cr      0 - 17 mg/g Cr 13.86   PSA      0 - 4 ug/L 0.68   Hemoglobin A1C      0 - 5.6 % 7.5 (H)   LDL Cholesterol Direct      <100 mg/dL 73     Please make a nurse visit for a blood pressure check.    Your labs are looking good other than your triglycerides are high.      I changed your generic viagra for generic cialis to see if this works.    Please let us know if this helps or not.    I refilled your medications.      Thank you for choosing [facility].  You may be receiving an email and/or telephone survey request from [facility] Customer Experience regarding your visit today.  Please take a few minutes to respond to the survey to let us know how we are doing.      If you have questions or concerns, please contact us via [employer] or you can contact your care team at [phone].    Our Clinic hours are:  Monday 6:40 am  to 7:00 pm  Tuesday -Friday 6:40 am to 5:00 pm    The [place] outpatient lab hours are:  Monday - Friday 6:10 am to 4:45 pm  Saturdays 7:00 am to 11:00 am  Appointments are required, call [phone]    If you have clinical questions after hours or would like to schedule an appointment,   call the clinic at [phone].    ENT phone number in [place].  FMG: [facility] [place] [phone]  [email or web address]    Please make a fasting lab visit and also a nurse blood pressure check at [place] on or after 9/24/2020.    If your triglycerides are still high I recommend to add fenofibrate 145 mg or 160 mg which ever one is cheaper to your medications.  This works by lowering the triglycerides.

## 2020-09-08 DIAGNOSIS — I25.10 CORONARY ARTERY DISEASE DUE TO LIPID RICH PLAQUE: ICD-10-CM

## 2020-09-08 DIAGNOSIS — E78.5 HYPERLIPIDEMIA LDL GOAL <100: ICD-10-CM

## 2020-09-08 DIAGNOSIS — I10 ESSENTIAL HYPERTENSION: ICD-10-CM

## 2020-09-08 DIAGNOSIS — I25.83 CORONARY ARTERY DISEASE DUE TO LIPID RICH PLAQUE: ICD-10-CM

## 2020-09-09 NOTE — TELEPHONE ENCOUNTER
"Requested Prescriptions   Pending Prescriptions Disp Refills     atorvastatin (LIPITOR) 40 MG tablet [Pharmacy Med Name: ATORVASTATIN 40MG TABLETS] 90 tablet 3     Sig: TAKE 1 TABLET BY MOUTH EVERY DAY       Statins Protocol Passed - 9/8/2020  8:05 AM        Passed - LDL on file in past 12 months     Recent Labs   Lab Test 06/24/20  0819   LDL Cannot estimate LDL when triglyceride exceeds 400 mg/dL  73             Passed - No abnormal creatine kinase in past 12 months     No lab results found.             Passed - Recent (12 mo) or future (30 days) visit within the authorizing provider's specialty     Patient has had an office visit with the authorizing provider or a provider within the authorizing providers department within the previous 12 mos or has a future within next 30 days. See \"Patient Info\" tab in inbasket, or \"Choose Columns\" in Meds & Orders section of the refill encounter.              Passed - Medication is active on med list        Passed - Patient is age 18 or older             "

## 2020-09-10 RX ORDER — ATORVASTATIN CALCIUM 40 MG/1
TABLET, FILM COATED ORAL
Qty: 90 TABLET | Refills: 3 | Status: SHIPPED | OUTPATIENT
Start: 2020-09-10 | End: 2021-10-12

## 2020-09-15 ENCOUNTER — TELEPHONE (OUTPATIENT)
Dept: FAMILY MEDICINE | Facility: CLINIC | Age: 60
End: 2020-09-15

## 2020-09-15 DIAGNOSIS — N52.9 ERECTILE DYSFUNCTION, UNSPECIFIED ERECTILE DYSFUNCTION TYPE: ICD-10-CM

## 2020-09-15 RX ORDER — TADALAFIL 20 MG/1
20 TABLET ORAL DAILY PRN
Qty: 6 TABLET | Refills: 11 | Status: CANCELLED | OUTPATIENT
Start: 2020-09-15

## 2020-09-15 NOTE — TELEPHONE ENCOUNTER
New Pharmacy - Had to change pharmacy due to cost. Pt would like sent today please.  No need to call patient back, unless there are questions or problems.

## 2020-11-16 ENCOUNTER — HEALTH MAINTENANCE LETTER (OUTPATIENT)
Age: 60
End: 2020-11-16

## 2020-11-25 ENCOUNTER — TELEPHONE (OUTPATIENT)
Dept: FAMILY MEDICINE | Facility: CLINIC | Age: 60
End: 2020-11-25

## 2020-11-25 NOTE — TELEPHONE ENCOUNTER
Patient Quality Outreach Summary      Summary:    Patient is due/failing the following:   BP check, had virtual diabetes visit 9/2/20, has not had recorded bp since 7/24/19, per provider, can do RN bp check or virtual visit with provider if he has a digital bp machine to record pt reported bp.    Type of outreach:    Phone, left message for patient/parent to call back.    Questions for provider review:    None                                                                                                                    BALAJI Williamson MA       Chart routed to Care Team.

## 2020-11-25 NOTE — TELEPHONE ENCOUNTER
Panel Management Review      Patient has the following on his problem list:     Hypertension   Last three blood pressure readings:  BP Readings from Last 3 Encounters:   07/24/19 124/80   04/17/19 143/84   01/16/19 145/85     Blood pressure: FAILED    HTN Guidelines:  Less than 140/90      Patient is due/failing the following:   Patient is due/failing the following:   BP check, had virtual diabetes visit 9/2/20, has not had recorded bp since 7/24/19, per provider, can do RN bp check or virtual visit with provider if he has a digital bp machine to record pt reported bp.    Type of outreach:    Phone, spoke to patient.  he is now living in [place] and would like to do a RN BP check and Lab at at [facility]. He was given # to schedule these.     Questions for provider review:    None                                                                                                                                    Braeden MCCALL CMA

## 2020-12-09 ENCOUNTER — DOCUMENTATION ONLY (OUTPATIENT)
Dept: FAMILY MEDICINE | Facility: CLINIC | Age: 60
End: 2020-12-09

## 2020-12-09 ENCOUNTER — OFFICE VISIT (OUTPATIENT)
Dept: OTOLARYNGOLOGY | Facility: CLINIC | Age: 60
End: 2020-12-09
Payer: COMMERCIAL

## 2020-12-09 VITALS
HEART RATE: 93 BPM | OXYGEN SATURATION: 96 % | WEIGHT: 264 LBS | SYSTOLIC BLOOD PRESSURE: 115 MMHG | DIASTOLIC BLOOD PRESSURE: 73 MMHG | RESPIRATION RATE: 16 BRPM | BODY MASS INDEX: 36.96 KG/M2 | HEIGHT: 71 IN

## 2020-12-09 DIAGNOSIS — H60.8X3 CHRONIC ECZEMATOUS OTITIS EXTERNA OF BOTH EARS: Primary | ICD-10-CM

## 2020-12-09 DIAGNOSIS — E11.9 TYPE 2 DIABETES MELLITUS WITHOUT COMPLICATION, WITHOUT LONG-TERM CURRENT USE OF INSULIN (H): Primary | ICD-10-CM

## 2020-12-09 DIAGNOSIS — E11.65 UNCONTROLLED TYPE 2 DIABETES MELLITUS WITH HYPERGLYCEMIA (H): ICD-10-CM

## 2020-12-09 LAB
GRAM STN SPEC: ABNORMAL
SPECIMEN SOURCE: ABNORMAL

## 2020-12-09 PROCEDURE — 92504 EAR MICROSCOPY EXAMINATION: CPT | Performed by: OTOLARYNGOLOGY

## 2020-12-09 PROCEDURE — 87102 FUNGUS ISOLATION CULTURE: CPT | Performed by: OTOLARYNGOLOGY

## 2020-12-09 PROCEDURE — 87077 CULTURE AEROBIC IDENTIFY: CPT | Performed by: OTOLARYNGOLOGY

## 2020-12-09 PROCEDURE — 87070 CULTURE OTHR SPECIMN AEROBIC: CPT | Performed by: OTOLARYNGOLOGY

## 2020-12-09 PROCEDURE — 87205 SMEAR GRAM STAIN: CPT | Performed by: OTOLARYNGOLOGY

## 2020-12-09 PROCEDURE — 87186 SC STD MICRODIL/AGAR DIL: CPT | Performed by: OTOLARYNGOLOGY

## 2020-12-09 PROCEDURE — 99243 OFF/OP CNSLTJ NEW/EST LOW 30: CPT | Performed by: OTOLARYNGOLOGY

## 2020-12-09 ASSESSMENT — MIFFLIN-ST. JEOR: SCORE: 2026.69

## 2020-12-09 ASSESSMENT — PAIN SCALES - GENERAL: PAINLEVEL: NO PAIN (1)

## 2020-12-09 NOTE — LETTER
12/9/2020         RE: Roderick Willis  [id number] [facility] [address]        Dear Colleague,    Thank you for referring your patient, Roderick Willis, to the [facility]. Please see a copy of my visit note below.    I am seeing this patient in consultation for bilateral otorrhea at the request of the provider Dr. Homero Sanabria.    Chief Complaint - bilateral ear drainage    History of Present Illness - Roderick Willis is a 60 year old male who presents to me today with drainage in both ears for years, intermittently. Started as swimmer's ear. Sometimes has pain and plugging. It itches. He has tried flushing. + tinnitus. He notes some hearing loss. The patient has tried cortisporin. He is a type 2 diabetic. He has some skin irritation from drainage. He tries to keep ears dry. He has skin issues with dandruff. Also has recent skin rashes on face.     Past Medical History -   Patient Active Problem List   Diagnosis     Essential hypertension     Hyperlipidemia LDL goal <100     Heavy alcohol use     Heart murmur     Type 2 diabetes mellitus without complication (H)     NSTEMI (non-ST elevated myocardial infarction) (H)     Critical aortic valve stenosis     Aortic valve disease     Morbid obesity (H)     Uncontrolled type 2 diabetes mellitus with hyperglycemia (H)       Current Medications -   Current Outpatient Medications:      aspirin 81 MG tablet, Take 1 tablet (81 mg) by mouth daily, Disp: , Rfl:      atorvastatin (LIPITOR) 40 MG tablet, TAKE 1 TABLET BY MOUTH EVERY DAY, Disp: 90 tablet, Rfl: 3     glipiZIDE (GLUCOTROL XL) 10 MG 24 hr tablet, Take 1 tablet (10 mg) by mouth daily, Disp: 90 tablet, Rfl: 3     ibuprofen 200 MG capsule, Take 200 mg by mouth every 4 hours as needed for fever, Disp: , Rfl:      lisinopril (ZESTRIL) 10 MG tablet, Take 1 tablet (10 mg) by mouth daily, Disp: 90 tablet, Rfl: 3     metoprolol tartrate (LOPRESSOR) 25 MG tablet, Take 0.5 tablets (12.5 mg)  by mouth 2 times daily, Disp: 90 tablet, Rfl: 3     multivitamin, therapeutic with minerals (THERA-VIT-M) TABS, Take 1 tablet by mouth daily, Disp: 30 each, Rfl: 0     neomycin-polymyxin-hydrocortisone (CORTISPORIN) 3.5-03099-4 otic suspension, Place 3 drops into both ears 3 times daily Prn., Disp: 1 Bottle, Rfl: 1     tadalafil (CIALIS) 20 MG tablet, Take 1 tablet (20 mg) by mouth daily as needed (ED), Disp: 6 tablet, Rfl: 11     triamcinolone (KENALOG) 0.1 % cream, Apply sparingly to affected area three times daily for 14 days., Disp: 80 g, Rfl: 2    Allergies -   Allergies   Allergen Reactions     Seasonal Allergies        Social History -   Social History     Socioeconomic History     Marital status:      Spouse name: Not on file     Number of children: Not on file     Years of education: Not on file     Highest education level: Not on file   Occupational History     Not on file   Social Needs     Financial resource strain: Not on file     Food insecurity     Worry: Not on file     Inability: Not on file     Transportation needs     Medical: Not on file     Non-medical: Not on file   Tobacco Use     Smoking status: Former Smoker     Types: Cigars     Smokeless tobacco: Never Used   Substance and Sexual Activity     Alcohol use: Yes     Comment: 8-10 beers per week     Drug use: No     Sexual activity: Yes     Partners: Female   Lifestyle     Physical activity     Days per week: Not on file     Minutes per session: Not on file     Stress: Not on file   Relationships     Social connections     Talks on phone: Not on file     Gets together: Not on file     Attends Worship service: Not on file     Active member of club or organization: Not on file     Attends meetings of clubs or organizations: Not on file     Relationship status: Not on file     Intimate partner violence     Fear of current or ex partner: Not on file     Emotionally abused: Not on file     Physically abused: Not on file     Forced sexual  "activity: Not on file   Other Topics Concern     Parent/sibling w/ CABG, MI or angioplasty before 65F 55M? No   Social History Narrative     Not on file       Family History -   Family History   Problem Relation Age of Onset     Cancer Mother      Breast Cancer Mother      Heart Disease Maternal Grandmother      Respiratory Maternal Grandfather         copd     Diabetes Paternal Grandfather      Hypertension Father      Cataracts Father      Hyperlipidemia Father        Review of Systems - As per HPI and PMHx, otherwise 7 system review of the head and neck negative.    Physical Exam  /73   Pulse 93   Resp 16   Ht 1.791 m (5' 10.5\")   Wt 119.7 kg (264 lb)   SpO2 96%   BMI 37.34 kg/m    General - The patient is in no distress.  Alert and oriented to person and place, answers questions and cooperates with examination appropriately.   Voice and Breathing - The patient was breathing comfortably without the use of accessory muscles. There was no wheezing, stridor, or stertor.  The patients voice was clear and strong.  Ears - both ears showed conchal bowl and canal erythema and edema with dry flaky skin. There was moist debri impacting the medial canal. I cultured the right side as this maybe fungus. Using the binocular microscope I used a #5 suction and suctioned and debrided the debri in the medial canal pealing this off both tympanic membranes. I could then see the tympanic membranes. they appeared intact. No evidence of middle ear effusion. Both ears looked this same.   Eyes - Extraocular movements intact.  Sclera were not icteric or injected.  Mouth - Examination of the oral cavity showed pink, healthy mucosa. No lesions or ulcerations noted.  The tongue was mobile and midline.  Throat - The walls of the oropharynx were smooth, symmetric, and had no lesions or ulcerations.  The tonsillar pillars and soft palate were symmetric.  The uvula was midline on elevation.    Neck - Palpation of the occipital, " submental, submandibular, internal jugular chain, and supraclavicular nodes did not demonstrateany abnormal lymph nodes or masses. No parotid masses. Palpation of the thyroid was soft and smooth, with no nodules or goiter appreciated.  The trachea was mobile and midline.  Neurological - Cranial nerves 2 through 12 were grossly intact. House-Brackmann grade 1 out of 6 bilaterally.      Assessment and Plan - Roderick Willis is a 60 year old male has a bilateral chronic otitis externa, probably due to eczema. He may have fungus as a component. I debrided the canals under the microscope. I will follow-up on the ear culture. We may have to consider lotrimin and dermotic. He has dandruff and eczema and I asked him to shampoo ears with head and shoulders. I offered dermatology appointment. He wanted to hold off for now.     Anuj Mitchell MD  Otolaryngology  [facility]        Again, thank you for allowing me to participate in the care of your patient.        Sincerely,        Anuj Mitchell MD

## 2020-12-09 NOTE — PROGRESS NOTES
I am seeing this patient in consultation for bilateral otorrhea at the request of the provider Dr. Homero Sanabria.    Chief Complaint - bilateral ear drainage    History of Present Illness - Roderick Willis is a 60 year old male who presents to me today with drainage in both ears for years, intermittently. Started as swimmer's ear. Sometimes has pain and plugging. It itches. He has tried flushing. + tinnitus. He notes some hearing loss. The patient has tried cortisporin. He is a type 2 diabetic. He has some skin irritation from drainage. He tries to keep ears dry. He has skin issues with dandruff. Also has recent skin rashes on face.     Past Medical History -   Patient Active Problem List   Diagnosis     Essential hypertension     Hyperlipidemia LDL goal <100     Heavy alcohol use     Heart murmur     Type 2 diabetes mellitus without complication (H)     NSTEMI (non-ST elevated myocardial infarction) (H)     Critical aortic valve stenosis     Aortic valve disease     Morbid obesity (H)     Uncontrolled type 2 diabetes mellitus with hyperglycemia (H)       Current Medications -   Current Outpatient Medications:      aspirin 81 MG tablet, Take 1 tablet (81 mg) by mouth daily, Disp: , Rfl:      atorvastatin (LIPITOR) 40 MG tablet, TAKE 1 TABLET BY MOUTH EVERY DAY, Disp: 90 tablet, Rfl: 3     glipiZIDE (GLUCOTROL XL) 10 MG 24 hr tablet, Take 1 tablet (10 mg) by mouth daily, Disp: 90 tablet, Rfl: 3     ibuprofen 200 MG capsule, Take 200 mg by mouth every 4 hours as needed for fever, Disp: , Rfl:      lisinopril (ZESTRIL) 10 MG tablet, Take 1 tablet (10 mg) by mouth daily, Disp: 90 tablet, Rfl: 3     metoprolol tartrate (LOPRESSOR) 25 MG tablet, Take 0.5 tablets (12.5 mg) by mouth 2 times daily, Disp: 90 tablet, Rfl: 3     multivitamin, therapeutic with minerals (THERA-VIT-M) TABS, Take 1 tablet by mouth daily, Disp: 30 each, Rfl: 0     neomycin-polymyxin-hydrocortisone (CORTISPORIN) 3.5-03590-3 otic suspension, Place  3 drops into both ears 3 times daily Prn., Disp: 1 Bottle, Rfl: 1     tadalafil (CIALIS) 20 MG tablet, Take 1 tablet (20 mg) by mouth daily as needed (ED), Disp: 6 tablet, Rfl: 11     triamcinolone (KENALOG) 0.1 % cream, Apply sparingly to affected area three times daily for 14 days., Disp: 80 g, Rfl: 2    Allergies -   Allergies   Allergen Reactions     Seasonal Allergies        Social History -   Social History     Socioeconomic History     Marital status:      Spouse name: Not on file     Number of children: Not on file     Years of education: Not on file     Highest education level: Not on file   Occupational History     Not on file   Social Needs     Financial resource strain: Not on file     Food insecurity     Worry: Not on file     Inability: Not on file     Transportation needs     Medical: Not on file     Non-medical: Not on file   Tobacco Use     Smoking status: Former Smoker     Types: Cigars     Smokeless tobacco: Never Used   Substance and Sexual Activity     Alcohol use: Yes     Comment: 8-10 beers per week     Drug use: No     Sexual activity: Yes     Partners: Female   Lifestyle     Physical activity     Days per week: Not on file     Minutes per session: Not on file     Stress: Not on file   Relationships     Social connections     Talks on phone: Not on file     Gets together: Not on file     Attends Hinduism service: Not on file     Active member of club or organization: Not on file     Attends meetings of clubs or organizations: Not on file     Relationship status: Not on file     Intimate partner violence     Fear of current or ex partner: Not on file     Emotionally abused: Not on file     Physically abused: Not on file     Forced sexual activity: Not on file   Other Topics Concern     Parent/sibling w/ CABG, MI or angioplasty before 65F 55M? No   Social History Narrative     Not on file       Family History -   Family History   Problem Relation Age of Onset     Cancer Mother       "Breast Cancer Mother      Heart Disease Maternal Grandmother      Respiratory Maternal Grandfather         copd     Diabetes Paternal Grandfather      Hypertension Father      Cataracts Father      Hyperlipidemia Father        Review of Systems - As per HPI and PMHx, otherwise 7 system review of the head and neck negative.    Physical Exam  /73   Pulse 93   Resp 16   Ht 1.791 m (5' 10.5\")   Wt 119.7 kg (264 lb)   SpO2 96%   BMI 37.34 kg/m    General - The patient is in no distress.  Alert and oriented to person and place, answers questions and cooperates with examination appropriately.   Voice and Breathing - The patient was breathing comfortably without the use of accessory muscles. There was no wheezing, stridor, or stertor.  The patients voice was clear and strong.  Ears - both ears showed conchal bowl and canal erythema and edema with dry flaky skin. There was moist debri impacting the medial canal. I cultured the right side as this maybe fungus. Using the binocular microscope I used a #5 suction and suctioned and debrided the debri in the medial canal pealing this off both tympanic membranes. I could then see the tympanic membranes. they appeared intact. No evidence of middle ear effusion. Both ears looked this same.   Eyes - Extraocular movements intact.  Sclera were not icteric or injected.  Mouth - Examination of the oral cavity showed pink, healthy mucosa. No lesions or ulcerations noted.  The tongue was mobile and midline.  Throat - The walls of the oropharynx were smooth, symmetric, and had no lesions or ulcerations.  The tonsillar pillars and soft palate were symmetric.  The uvula was midline on elevation.    Neck - Palpation of the occipital, submental, submandibular, internal jugular chain, and supraclavicular nodes did not demonstrateany abnormal lymph nodes or masses. No parotid masses. Palpation of the thyroid was soft and smooth, with no nodules or goiter appreciated.  The trachea was " mobile and midline.  Neurological - Cranial nerves 2 through 12 were grossly intact. House-Brackmann grade 1 out of 6 bilaterally.      Assessment and Plan - Roderick Willis is a 60 year old male has a bilateral chronic otitis externa, probably due to eczema. He may have fungus as a component. I debrided the canals under the microscope. I will follow-up on the ear culture. We may have to consider lotrimin and dermotic. He has dandruff and eczema and I asked him to shampoo ears with head and shoulders. I offered dermatology appointment. He wanted to hold off for now.     Anuj Mitchell MD  Otolaryngology  [facility]

## 2020-12-10 ENCOUNTER — TELEPHONE (OUTPATIENT)
Dept: FAMILY MEDICINE | Facility: CLINIC | Age: 60
End: 2020-12-10

## 2020-12-10 DIAGNOSIS — H60.8X3 CHRONIC ECZEMATOUS OTITIS EXTERNA OF BOTH EARS: Primary | ICD-10-CM

## 2020-12-10 DIAGNOSIS — H60.313 ACUTE DIFFUSE OTITIS EXTERNA OF BOTH EARS: ICD-10-CM

## 2020-12-10 RX ORDER — CIPROFLOXACIN AND DEXAMETHASONE 3; 1 MG/ML; MG/ML
4 SUSPENSION/ DROPS AURICULAR (OTIC) 2 TIMES DAILY
Qty: 7.5 ML | Refills: 0 | Status: SHIPPED | OUTPATIENT
Start: 2020-12-10 | End: 2020-12-20

## 2020-12-10 NOTE — TELEPHONE ENCOUNTER
Reason for Call:  Other prescription    Detailed comments: rx: ciprodex per instructions this bottle will not last the full 10 days.  Going to be close but it might not make it.  Call pharm and advise.  Caller informed that calls received after 3pm may not be returned same day.      Phone Number Patient can be reached at: Other phone number:  [facility]*    Best Time: any    Can we leave a detailed message on this number? YES    Call taken on 12/10/2020 at 5:51 PM by Frida Almodovar

## 2020-12-10 NOTE — PROGRESS NOTES
This patient is scheduled for lab work on 12/16/2020 but does not qualify for pre-visit protocol. Please place future orders, or have your care team call and advise patient to cancel lab appointment. There are no future scheduled appointments with you at this time.    Thank you,    Laura Vo MLT ([facility])  [facility]

## 2020-12-10 NOTE — PROGRESS NOTES
ciprodex prescribed for now as the culture is growing non lactose fermenting gram negative rods, probably pseudomonas. I will follow-up on the fungal culture.

## 2020-12-11 NOTE — TELEPHONE ENCOUNTER
Returned call to pharmacy who stated prescription will last for 9.35 days and not full 10 days. Message routed to provider to ensure ok to leave as written considering there are culture results pending which may change treatment recommendations. Zoey Anderson RN, BSN [facility]

## 2020-12-11 NOTE — TELEPHONE ENCOUNTER
The patient's ear culture is growing out multiple types of bacteria.  I want him to use Ciprodex for 10 days.  He may run out a half a day or a day short which is fine.  Following completion of the drops I want him to return to clinic second check both ears.  I do not see any fungus on the culture so therefore I will not prescribe any antifungals at this time.  He should just use the Ciprodex, keep the ears dry, and follow-up in 2 weeks

## 2020-12-12 LAB
BACTERIA SPEC CULT: ABNORMAL
SPECIMEN SOURCE: ABNORMAL

## 2020-12-16 DIAGNOSIS — E11.9 TYPE 2 DIABETES MELLITUS WITHOUT COMPLICATION, WITHOUT LONG-TERM CURRENT USE OF INSULIN (H): ICD-10-CM

## 2020-12-16 LAB — HBA1C MFR BLD: 8 % (ref 0–5.6)

## 2020-12-16 PROCEDURE — 36415 COLL VENOUS BLD VENIPUNCTURE: CPT | Performed by: FAMILY MEDICINE

## 2020-12-16 PROCEDURE — 83036 HEMOGLOBIN GLYCOSYLATED A1C: CPT | Performed by: FAMILY MEDICINE

## 2020-12-16 RX ORDER — GLIPIZIDE 5 MG/1
15 TABLET, FILM COATED, EXTENDED RELEASE ORAL DAILY
Qty: 270 TABLET | Refills: 3 | Status: SHIPPED | OUTPATIENT
Start: 2020-12-16 | End: 2021-10-12 | Stop reason: DRUGHIGH

## 2020-12-16 NOTE — ADDENDUM NOTE
Addended by: VIVIANA AIKEN on: 12/16/2020 12:30 PM     Modules accepted: Orders     s/p laparoscopic appendectomy WOUND CARE:  Please keep incisions clean and dry. Please do not Scrub or rub incisions. Do not use lotion or powder on incisions.   BATHING: You may shower and/or sponge bathe. You may use warm soapy water in the shower and rinse, pat dry.  ACTIVITY: No heavy lifting or straining. Otherwise, you may return to your usual level of physical activity. If you are taking narcotic pain medication DO NOT drive a car, operate machinery or make important decisions.  DIET: Return to your usual diet.  NOTIFY YOUR SURGEON IF YOU HAVE: any bleeding that does not stop, any pus draining from your wound(s), any fever (over 100.4 F) persistent nausea/vomiting, or if your pain is not controlled on your discharge pain medications, unable to urinate.  Please follow up with your primary care physician in one week regarding your hospitalization, bring copies of your discharge paperwork.  Please follow up with your surgeon, Dr. Lopez as an outpatient, please call [phone] to schedule appointment please resume home dose Lovenox tonight please continue your home medications and follow up with your PMD as an outpatient, please call to schedule appointment WOUND CARE:  Please keep incisions clean and dry. Please do not Scrub or rub incisions. Do not use lotion or powder on incisions.   BATHING: You may shower and/or sponge bathe. You may use warm soapy water in the shower and rinse, pat dry.  ACTIVITY: No heavy lifting or straining. Otherwise, you may return to your usual level of physical activity. If you are taking narcotic pain medication DO NOT drive a car, operate machinery or make important decisions.  DIET: Return to your usual diet.  NOTIFY YOUR SURGEON IF YOU HAVE: any bleeding that does not stop, any pus draining from your wound(s), any fever (over 100.4 F) persistent nausea/vomiting, or if your pain is not controlled on your discharge pain medications, unable to urinate.  Please follow up with your primary care physician in one week regarding your hospitalization, bring copies of your discharge paperwork.  Please follow up with your surgeon, Dr. Lopez as an outpatient, please call [phone] to schedule appointment  While in the hospital you had a CT scan performed and a small lesion was found on your bladder, please follow up with a Urologist at the [facility] regarding this mass, please call  [phone] to schedule appointment WOUND CARE:  Please keep incisions clean and dry. Please do not Scrub or rub incisions. Do not use lotion or powder on incisions.   BATHING: You may shower and/or sponge bathe. You may use warm soapy water in the shower and rinse, pat dry.  ACTIVITY: No heavy lifting or straining. Otherwise, you may return to your usual level of physical activity. If you are taking narcotic pain medication DO NOT drive a car, operate machinery or make important decisions.  DIET: Return to your usual diet.  NOTIFY YOUR SURGEON IF YOU HAVE: any bleeding that does not stop, any pus draining from your wound(s), any fever (over 100.4 F) persistent nausea/vomiting, persistent diarrhea, stop passing gas/having bowel movements or if your pain is not controlled on your discharge pain medications, unable to urinate.  Please follow up with your primary care physician in one week regarding your hospitalization, bring copies of your discharge paperwork.  Please follow up with your surgeon, Dr. Lopez as an outpatient, please call [phone] to schedule appointment  please complete full course of antibiotics as prescribed (7 days Augmentin)  While in the hospital you had a CT scan performed and a small lesion was found on your bladder, please follow up with a Urologist at the [facility] regarding this mass, please call  [phone] to schedule appointment or you may follow up with your PMD who can recommend a Urologist you may resume your home dose Eliquis please continue your home medications and follow up with your PMD/Cardiologist as an outpatient, please call to schedule appointment

## 2021-01-06 LAB
FUNGUS SPEC CULT: NORMAL
SPECIMEN SOURCE: NORMAL

## 2021-03-30 DIAGNOSIS — I25.83 CORONARY ARTERY DISEASE DUE TO LIPID RICH PLAQUE: ICD-10-CM

## 2021-03-30 DIAGNOSIS — I25.10 CORONARY ARTERY DISEASE DUE TO LIPID RICH PLAQUE: ICD-10-CM

## 2021-03-30 DIAGNOSIS — I10 ESSENTIAL HYPERTENSION: ICD-10-CM

## 2021-03-30 RX ORDER — METOPROLOL TARTRATE 25 MG/1
12.5 TABLET, FILM COATED ORAL 2 TIMES DAILY
Qty: 90 TABLET | Refills: 3 | Status: CANCELLED | OUTPATIENT
Start: 2021-03-30

## 2021-03-30 NOTE — TELEPHONE ENCOUNTER
Reason for Call:  Other prescription    Detailed comments: patient is requesting a refill on his metoprolol please.    Phone Number Patient can be reached at: Cell number on file:    Telephone Information:   Mobile [phone]       Best Time:     Can we leave a detailed message on this number? YES    Call taken on 3/30/2021 at 10:55 AM by Indira Sheehan

## 2021-06-21 ENCOUNTER — TELEPHONE (OUTPATIENT)
Dept: FAMILY MEDICINE | Facility: CLINIC | Age: 61
End: 2021-06-21

## 2021-06-21 DIAGNOSIS — I10 ESSENTIAL HYPERTENSION: ICD-10-CM

## 2021-06-21 DIAGNOSIS — E78.5 HYPERLIPIDEMIA LDL GOAL <100: ICD-10-CM

## 2021-06-21 DIAGNOSIS — E11.9 TYPE 2 DIABETES MELLITUS WITHOUT COMPLICATION, WITHOUT LONG-TERM CURRENT USE OF INSULIN (H): ICD-10-CM

## 2021-06-21 DIAGNOSIS — Z12.5 SCREENING FOR PROSTATE CANCER: Primary | ICD-10-CM

## 2021-06-21 NOTE — TELEPHONE ENCOUNTER
Patient Quality Outreach Summary      Summary:    Patient is due/failing the following:   Diabetic Follow-Up Visit with provider.  Per Dr. Sanabria review of diabetes list.    Type of outreach:    Phone, left message for patient/parent to call back.    Questions for provider review:    None                                                                                                                    BALAJI Williamson MA       Chart routed to Care Team.

## 2021-06-23 DIAGNOSIS — I35.9 AORTIC VALVE DISEASE: Primary | ICD-10-CM

## 2021-06-28 ENCOUNTER — TELEPHONE (OUTPATIENT)
Dept: FAMILY MEDICINE | Facility: CLINIC | Age: 61
End: 2021-06-28

## 2021-06-28 NOTE — TELEPHONE ENCOUNTER
Reason for call:    Symptom or request:       [facility] called with concerns about patient's glipizide dosing. He reports was getting 10 mgs  And recently changed to 5 mg.     Glipizide 10 mg 09/2--10 mg  Glipizide 5 mg 12/16/2020--15 mg     Homero Sanabria MD   12/16/2020 12:28 PM CST      Kermitnaya Vaughn,  Your hemoglobin a1c has gone up.  I recommend to increase your glipizide xl to 15 mg once a day.  Recheck with a lab visit in 3 months for a hemoglobin a1c.  Sincerely,  Homero Sanabria MD     Please call with clarification, do not send new script.   Ref # [id number]      Linda CARPIO  [facility]

## 2021-07-14 ENCOUNTER — ANCILLARY PROCEDURE (OUTPATIENT)
Dept: CARDIOLOGY | Facility: CLINIC | Age: 61
End: 2021-07-14
Attending: INTERNAL MEDICINE
Payer: COMMERCIAL

## 2021-07-14 DIAGNOSIS — I35.9 AORTIC VALVE DISEASE: ICD-10-CM

## 2021-07-14 LAB — LVEF ECHO: NORMAL

## 2021-07-14 PROCEDURE — 99207 PR STATISTIC IV PUSH SINGLE INITIAL SUBSTANCE: CPT | Performed by: INTERNAL MEDICINE

## 2021-07-14 PROCEDURE — 93306 TTE W/DOPPLER COMPLETE: CPT | Performed by: INTERNAL MEDICINE

## 2021-07-14 RX ADMIN — Medication 3 ML: at 10:17

## 2021-07-18 ENCOUNTER — HEALTH MAINTENANCE LETTER (OUTPATIENT)
Age: 61
End: 2021-07-18

## 2021-07-21 ENCOUNTER — OFFICE VISIT (OUTPATIENT)
Dept: CARDIOLOGY | Facility: CLINIC | Age: 61
End: 2021-07-21
Payer: COMMERCIAL

## 2021-07-21 VITALS
DIASTOLIC BLOOD PRESSURE: 80 MMHG | TEMPERATURE: 98 F | BODY MASS INDEX: 38.22 KG/M2 | WEIGHT: 270.2 LBS | SYSTOLIC BLOOD PRESSURE: 128 MMHG | OXYGEN SATURATION: 95 % | HEART RATE: 91 BPM

## 2021-07-21 DIAGNOSIS — I25.10 CORONARY ARTERY DISEASE DUE TO LIPID RICH PLAQUE: ICD-10-CM

## 2021-07-21 DIAGNOSIS — I25.83 CORONARY ARTERY DISEASE DUE TO LIPID RICH PLAQUE: ICD-10-CM

## 2021-07-21 PROCEDURE — 99213 OFFICE O/P EST LOW 20 MIN: CPT | Performed by: INTERNAL MEDICINE

## 2021-07-21 NOTE — LETTER
2021    Homero Sanabria MD  [address] [facility] [address]    RE: Roderick Willis       Dear Colleague,    I had the pleasure of seeing Roderick Willis in the [facility].    HPI and Plan:   Today, I had the pleasure of seeing Roderick Willis at [facility] in [place]. He is a pleasant 60 year old patient with a severe aortic stenosis 2/2 bicuspid aortic valve with evidence of ascending aortic dilatation s/p repair with placement of a 23 mm Magna Juárez valve along with a 30 mm Gelweave graft of the ascending aorta.   He was last seen by me in 2019.      There has been some concern of patient prosthesis mismatch with the echocardiogram in 2016 showing effective orifice area of 0.75 cm and a short aortic valve acceleration time and a DVI of 0.33.    Echocardiogram 2019 showed effective orifice area of 1.5 cm  and DVI of 40.  Unchanged mean aortic valve gradient.  Importantly, the patient has been asymptomatic and denies shortness of breath, dyspnea on exertion.  He also denies chest pain, PND, orthopnea, syncope or any other cardiac related concerns.       TTE 2016: Ao V2 max: 3.2 m/s, Ao mean P mmHg, EOA: 0.75 cm2, DVI: 0.33, and AT: <100 ms.  TTE 2019: Ao V2 max: 3.1 m/sec, Ao mean P.4 mmHg, EOA 1.5 cm2, DVI 0.40  TTE : Ao V2 max: 2.7 m/sec, Ao mean P mmHg, EOA 1.4 cm2    ASSESSMENT:      1.  Bicuspid aortic valve, status post aortic valve replacement with a bioprosthetic valve and repair of the ascending aortic aneurysm.     2.  Possible Patient prosthesis mismatch- stable gradient and EOA of 1.5 cm2 and asymptomatic  3.  Hypertension.   4.  Hyperlipidemia.   5.  Normal coronaries on angiogram in 2016    RECOMMENDATIONS:  The patient is doing well from a clinical standpoint.  The mean gradient across the valve is unchanged and is more consistent with a EOA of 1.5 cms2. Fortunately, the patient is  asymptomatic. I will keep a close eye on his bioprosthetic valve by performing an echocardiogram in approximately 2 years timeframe.  He has a high deductible and would not like to get an ultrasound on a yearly basis.  Patient is advised to reach out to us in case he develops any symptoms.     Thank you for allowing me to participate in the care of Roderick Allan MD  Cardiology    This note was completed in part using Dragon voice recognition software. Although reviewed after completion, some word and grammatical errors may occur.    Orders Placed This Encounter   Procedures     Follow-Up with Cardiologist       Encounter Diagnosis   Name Primary?     Coronary artery disease due to lipid rich plaque        CURRENT MEDICATIONS:  Current Outpatient Medications   Medication Sig Dispense Refill     aspirin 81 MG tablet Take 1 tablet (81 mg) by mouth daily       atorvastatin (LIPITOR) 40 MG tablet TAKE 1 TABLET BY MOUTH EVERY DAY 90 tablet 3     glipiZIDE (GLUCOTROL XL) 5 MG 24 hr tablet Take 3 tablets (15 mg) by mouth daily 270 tablet 3     ibuprofen 200 MG capsule Take 200 mg by mouth every 4 hours as needed for fever       lisinopril (ZESTRIL) 10 MG tablet Take 1 tablet (10 mg) by mouth daily 90 tablet 3     metoprolol tartrate (LOPRESSOR) 25 MG tablet Take 0.5 tablets (12.5 mg) by mouth 2 times daily 90 tablet 3     multivitamin, therapeutic with minerals (THERA-VIT-M) TABS Take 1 tablet by mouth daily 30 each 0     neomycin-polymyxin-hydrocortisone (CORTISPORIN) 3.5-63782-6 otic suspension Place 3 drops into both ears 3 times daily Prn. 1 Bottle 1     tadalafil (CIALIS) 20 MG tablet Take 1 tablet (20 mg) by mouth daily as needed (ED) 6 tablet 11     triamcinolone (KENALOG) 0.1 % cream Apply sparingly to affected area three times daily for 14 days. 80 g 2       ALLERGIES     Allergies   Allergen Reactions     Seasonal Allergies        PAST MEDICAL HISTORY:  Past Medical History:   Diagnosis Date      Critical aortic valve stenosis     CLARK 0.54, mean gr 85     Dyslipidemia      Hypertension        PAST SURGICAL HISTORY:  Past Surgical History:   Procedure Laterality Date     APPENDECTOMY  10/6/1988     REPAIR VALVE AORTIC N/A 3/3/2016    Procedure: REPAIR VALVE AORTIC;  Surgeon: Isaac Yin MD;  Location: [facility] OR     VASECTOMY         FAMILY HISTORY:  Family History   Problem Relation Age of Onset     Cancer Mother      Breast Cancer Mother      Heart Disease Maternal Grandmother      Respiratory Maternal Grandfather         copd     Diabetes Paternal Grandfather      Hypertension Father      Cataracts Father      Hyperlipidemia Father        SOCIAL HISTORY:  Social History     Socioeconomic History     Marital status:      Spouse name: None     Number of children: None     Years of education: None     Highest education level: None   Occupational History     None   Tobacco Use     Smoking status: Former Smoker     Types: Cigars     Smokeless tobacco: Never Used   Substance and Sexual Activity     Alcohol use: Yes     Comment: 8-10 beers per week     Drug use: No     Sexual activity: Yes     Partners: Female   Other Topics Concern     Parent/sibling w/ CABG, MI or angioplasty before 65F 55M? No   Social History Narrative     None     Social Determinants of Health     Financial Resource Strain:      Difficulty of Paying Living Expenses:    Food Insecurity:      Worried About Running Out of Food in the Last Year:      Ran Out of Food in the Last Year:    Transportation Needs:      Lack of Transportation (Medical):      Lack of Transportation (Non-Medical):    Physical Activity:      Days of Exercise per Week:      Minutes of Exercise per Session:    Stress:      Feeling of Stress :    Social Connections:      Frequency of Communication with Friends and Family:      Frequency of Social Gatherings with Friends and Family:      Attends [employer] Services:      Active Member of Clubs or Organizations:       Attends Club or Organization Meetings:      Marital Status:    Intimate Partner Violence:      Fear of Current or Ex-Partner:      Emotionally Abused:      Physically Abused:      Sexually Abused:        Review of Systems:  Skin:  Negative       Eyes:  Positive for glasses    ENT:  Positive for tinnitus    Respiratory:  Positive for dyspnea on exertion;shortness of breath     Cardiovascular:  Negative      Gastroenterology: Negative      Genitourinary:  Positive for urgency;urinary frequency    Musculoskeletal:  Negative      Neurologic:  Negative      Psychiatric:  Negative      Heme/Lymph/Imm:  Negative      Endocrine:  Positive for diabetes      Physical Exam:  Vitals: /80   Pulse 91   Temp 98  F (36.7  C) (Tympanic)   Wt 122.6 kg (270 lb 3.2 oz)   SpO2 95%   BMI 38.22 kg/m    Eyes: No icterus.  Pulmonary: Chest symmetric, lungs clear bilaterally and no crackles, wheezes or rales.  Cardiovascular: RRR with normal S1 and S2, no murmur, JVP normal.  Musculoskeletal: Edema of the lower extremities: None.  Neurologic: Oriented and appropriate without obvious focal deficits.   Psychiatric: Normal affect.     Recent Lab Results:  LIPID RESULTS:  Lab Results   Component Value Date    CHOL 169 06/24/2020    HDL 41 06/24/2020    LDL  06/24/2020     Cannot estimate LDL when triglyceride exceeds 400 mg/dL    LDL 73 06/24/2020    TRIG 571 (H) 06/24/2020    CHOLHDLRATIO 2.9 03/04/2015       LIVER ENZYME RESULTS:  Lab Results   Component Value Date    AST 34 07/16/2016    ALT 68 07/16/2016       CBC RESULTS:  Lab Results   Component Value Date    WBC 10.7 07/16/2016    RBC 5.16 07/16/2016    HGB 13.7 07/16/2016    HCT 41.1 07/16/2016    MCV 80 07/16/2016    MCH 26.6 07/16/2016    MCHC 33.3 07/16/2016    RDW 18.6 (H) 07/16/2016     07/16/2016       BMP RESULTS:  Lab Results   Component Value Date     06/24/2020    POTASSIUM 4.1 06/24/2020    CHLORIDE 101 06/24/2020    CO2 28 06/24/2020    ANIONGAP 5  06/24/2020     (H) 06/24/2020    BUN 18 06/24/2020    CR 0.79 06/24/2020    GFRESTIMATED >90 06/24/2020    GFRESTBLACK >90 06/24/2020    NATE 9.2 06/24/2020        A1C RESULTS:  Lab Results   Component Value Date    A1C 8.0 (H) 12/16/2020       INR RESULTS:  Lab Results   Component Value Date    INR 1.26 (H) 03/04/2016    INR 1.11 03/03/2016       CC  Chago Allan MD  [address]  [place] [id number]    All medical records were reviewed in detail and discussed with the patient. Greater than 30 mins were spent with the patient, 50% of this time was spent on counseling and coordination of care.  After visit summary was printed and given to the patient.        Thank you for allowing me to participate in the care of your patient.      Sincerely,     Chago Allan MD     [facility]  cc:   Chago Allan MD  [address]  [place] [id number]

## 2021-07-21 NOTE — PROGRESS NOTES
HPI and Plan:   Today, I had the pleasure of seeing Roderick Willis at [facility] in [place]. He is a pleasant 60 year old patient with a severe aortic stenosis 2/2 bicuspid aortic valve with evidence of ascending aortic dilatation s/p repair with placement of a 23 mm Magna Juárez valve along with a 30 mm Gelweave graft of the ascending aorta.   He was last seen by me in 2019.      There has been some concern of patient prosthesis mismatch with the echocardiogram in 2016 showing effective orifice area of 0.75 cm and a short aortic valve acceleration time and a DVI of 0.33.    Echocardiogram 2019 showed effective orifice area of 1.5 cm  and DVI of 40.  Unchanged mean aortic valve gradient.  Importantly, the patient has been asymptomatic and denies shortness of breath, dyspnea on exertion.  He also denies chest pain, PND, orthopnea, syncope or any other cardiac related concerns.       TTE 2016: Ao V2 max: 3.2 m/s, Ao mean P mmHg, EOA: 0.75 cm2, DVI: 0.33, and AT: <100 ms.  TTE 2019: Ao V2 max: 3.1 m/sec, Ao mean P.4 mmHg, EOA 1.5 cm2, DVI 0.40  TTE : Ao V2 max: 2.7 m/sec, Ao mean P mmHg, EOA 1.4 cm2    ASSESSMENT:      1.  Bicuspid aortic valve, status post aortic valve replacement with a bioprosthetic valve and repair of the ascending aortic aneurysm.     2.  Possible Patient prosthesis mismatch- stable gradient and EOA of 1.5 cm2 and asymptomatic  3.  Hypertension.   4.  Hyperlipidemia.   5.  Normal coronaries on angiogram in 2016    RECOMMENDATIONS:  The patient is doing well from a clinical standpoint.  The mean gradient across the valve is unchanged and is more consistent with a EOA of 1.5 cms2. Fortunately, the patient is asymptomatic. I will keep a close eye on his bioprosthetic valve by performing an echocardiogram in approximately 2 years timeframe.  He has a high deductible and would not like to get an ultrasound on a yearly basis.  Patient is advised to reach out  to us in case he develops any symptoms.     Thank you for allowing me to participate in the care of Roderick Allan MD  Cardiology    This note was completed in part using Dragon voice recognition software. Although reviewed after completion, some word and grammatical errors may occur.    Orders Placed This Encounter   Procedures     Follow-Up with Cardiologist       Encounter Diagnosis   Name Primary?     Coronary artery disease due to lipid rich plaque        CURRENT MEDICATIONS:  Current Outpatient Medications   Medication Sig Dispense Refill     aspirin 81 MG tablet Take 1 tablet (81 mg) by mouth daily       atorvastatin (LIPITOR) 40 MG tablet TAKE 1 TABLET BY MOUTH EVERY DAY 90 tablet 3     glipiZIDE (GLUCOTROL XL) 5 MG 24 hr tablet Take 3 tablets (15 mg) by mouth daily 270 tablet 3     ibuprofen 200 MG capsule Take 200 mg by mouth every 4 hours as needed for fever       lisinopril (ZESTRIL) 10 MG tablet Take 1 tablet (10 mg) by mouth daily 90 tablet 3     metoprolol tartrate (LOPRESSOR) 25 MG tablet Take 0.5 tablets (12.5 mg) by mouth 2 times daily 90 tablet 3     multivitamin, therapeutic with minerals (THERA-VIT-M) TABS Take 1 tablet by mouth daily 30 each 0     neomycin-polymyxin-hydrocortisone (CORTISPORIN) 3.5-09747-7 otic suspension Place 3 drops into both ears 3 times daily Prn. 1 Bottle 1     tadalafil (CIALIS) 20 MG tablet Take 1 tablet (20 mg) by mouth daily as needed (ED) 6 tablet 11     triamcinolone (KENALOG) 0.1 % cream Apply sparingly to affected area three times daily for 14 days. 80 g 2       ALLERGIES     Allergies   Allergen Reactions     Seasonal Allergies        PAST MEDICAL HISTORY:  Past Medical History:   Diagnosis Date     Critical aortic valve stenosis     CLARK 0.54, mean gr 85     Dyslipidemia      Hypertension        PAST SURGICAL HISTORY:  Past Surgical History:   Procedure Laterality Date     APPENDECTOMY  10/6/1988     REPAIR VALVE AORTIC N/A 3/3/2016     Procedure: REPAIR VALVE AORTIC;  Surgeon: Isaac Yin MD;  Location: [facility] OR     VASECTOMY         FAMILY HISTORY:  Family History   Problem Relation Age of Onset     Cancer Mother      Breast Cancer Mother      Heart Disease Maternal Grandmother      Respiratory Maternal Grandfather         copd     Diabetes Paternal Grandfather      Hypertension Father      Cataracts Father      Hyperlipidemia Father        SOCIAL HISTORY:  Social History     Socioeconomic History     Marital status:      Spouse name: None     Number of children: None     Years of education: None     Highest education level: None   Occupational History     None   Tobacco Use     Smoking status: Former Smoker     Types: Cigars     Smokeless tobacco: Never Used   Substance and Sexual Activity     Alcohol use: Yes     Comment: 8-10 beers per week     Drug use: No     Sexual activity: Yes     Partners: Female   Other Topics Concern     Parent/sibling w/ CABG, MI or angioplasty before 65F 55M? No   Social History Narrative     None     Social Determinants of Health     Financial Resource Strain:      Difficulty of Paying Living Expenses:    Food Insecurity:      Worried About Running Out of Food in the Last Year:      Ran Out of Food in the Last Year:    Transportation Needs:      Lack of Transportation (Medical):      Lack of Transportation (Non-Medical):    Physical Activity:      Days of Exercise per Week:      Minutes of Exercise per Session:    Stress:      Feeling of Stress :    Social Connections:      Frequency of Communication with Friends and Family:      Frequency of Social Gatherings with Friends and Family:      Attends [employer] Services:      Active Member of Clubs or Organizations:      Attends Club or Organization Meetings:      Marital Status:    Intimate Partner Violence:      Fear of Current or Ex-Partner:      Emotionally Abused:      Physically Abused:      Sexually Abused:        Review of Systems:  Skin:  Negative        Eyes:  Positive for glasses    ENT:  Positive for tinnitus    Respiratory:  Positive for dyspnea on exertion;shortness of breath     Cardiovascular:  Negative      Gastroenterology: Negative      Genitourinary:  Positive for urgency;urinary frequency    Musculoskeletal:  Negative      Neurologic:  Negative      Psychiatric:  Negative      Heme/Lymph/Imm:  Negative      Endocrine:  Positive for diabetes      Physical Exam:  Vitals: /80   Pulse 91   Temp 98  F (36.7  C) (Tympanic)   Wt 122.6 kg (270 lb 3.2 oz)   SpO2 95%   BMI 38.22 kg/m    Eyes: No icterus.  Pulmonary: Chest symmetric, lungs clear bilaterally and no crackles, wheezes or rales.  Cardiovascular: RRR with normal S1 and S2, no murmur, JVP normal.  Musculoskeletal: Edema of the lower extremities: None.  Neurologic: Oriented and appropriate without obvious focal deficits.   Psychiatric: Normal affect.     Recent Lab Results:  LIPID RESULTS:  Lab Results   Component Value Date    CHOL 169 06/24/2020    HDL 41 06/24/2020    LDL  06/24/2020     Cannot estimate LDL when triglyceride exceeds 400 mg/dL    LDL 73 06/24/2020    TRIG 571 (H) 06/24/2020    CHOLHDLRATIO 2.9 03/04/2015       LIVER ENZYME RESULTS:  Lab Results   Component Value Date    AST 34 07/16/2016    ALT 68 07/16/2016       CBC RESULTS:  Lab Results   Component Value Date    WBC 10.7 07/16/2016    RBC 5.16 07/16/2016    HGB 13.7 07/16/2016    HCT 41.1 07/16/2016    MCV 80 07/16/2016    MCH 26.6 07/16/2016    MCHC 33.3 07/16/2016    RDW 18.6 (H) 07/16/2016     07/16/2016       BMP RESULTS:  Lab Results   Component Value Date     06/24/2020    POTASSIUM 4.1 06/24/2020    CHLORIDE 101 06/24/2020    CO2 28 06/24/2020    ANIONGAP 5 06/24/2020     (H) 06/24/2020    BUN 18 06/24/2020    CR 0.79 06/24/2020    GFRESTIMATED >90 06/24/2020    GFRESTBLACK >90 06/24/2020    NATE 9.2 06/24/2020        A1C RESULTS:  Lab Results   Component Value Date    A1C 8.0 (H) 12/16/2020        INR RESULTS:  Lab Results   Component Value Date    INR 1.26 (H) 03/04/2016    INR 1.11 03/03/2016       CC  Chago Allan MD  [address]  [place] [id number]    All medical records were reviewed in detail and discussed with the patient. Greater than 30 mins were spent with the patient, 50% of this time was spent on counseling and coordination of care.  After visit summary was printed and given to the patient.

## 2021-08-04 ENCOUNTER — MYC MEDICAL ADVICE (OUTPATIENT)
Dept: FAMILY MEDICINE | Facility: CLINIC | Age: 61
End: 2021-08-04

## 2021-08-15 ENCOUNTER — MYC MEDICAL ADVICE (OUTPATIENT)
Dept: FAMILY MEDICINE | Facility: CLINIC | Age: 61
End: 2021-08-15

## 2021-08-15 ENCOUNTER — TELEPHONE (OUTPATIENT)
Dept: FAMILY MEDICINE | Facility: CLINIC | Age: 61
End: 2021-08-15

## 2021-08-15 DIAGNOSIS — I25.83 CORONARY ARTERY DISEASE DUE TO LIPID RICH PLAQUE: ICD-10-CM

## 2021-08-15 DIAGNOSIS — I10 ESSENTIAL HYPERTENSION: ICD-10-CM

## 2021-08-15 DIAGNOSIS — I25.10 CORONARY ARTERY DISEASE DUE TO LIPID RICH PLAQUE: ICD-10-CM

## 2021-08-15 NOTE — LETTER
August 18, 2021      Roderick Willis  [address] [facility] [address]        Dear Roderick,     We received a refill request for your Metoprolol and Lisinopril medication.  This medication has been refilled for 90 days as you are due for an office visit for further refills.  Please call [phone] to schedule an appointment.        Sincerely,        Homero Sanabria MD

## 2021-08-17 RX ORDER — METOPROLOL TARTRATE 25 MG/1
12.5 TABLET, FILM COATED ORAL 2 TIMES DAILY
Qty: 90 TABLET | Refills: 0 | Status: SHIPPED | OUTPATIENT
Start: 2021-08-17 | End: 2021-10-12

## 2021-08-17 RX ORDER — LISINOPRIL 10 MG/1
10 TABLET ORAL DAILY
Qty: 90 TABLET | Refills: 0 | Status: SHIPPED | OUTPATIENT
Start: 2021-08-17 | End: 2021-10-12

## 2021-08-17 NOTE — TELEPHONE ENCOUNTER
"Requested Prescriptions   Pending Prescriptions Disp Refills     metoprolol tartrate (LOPRESSOR) 25 MG tablet [Pharmacy Med Name: METOPROLOL TARTRATE TABS 25MG] 90 tablet 3     Sig: TAKE ONE-HALF TABLET BY MOUTH TWICE DAILY GENERIC EQUIVALENT FOR LOPRESSER       Beta-Blockers Protocol Passed - 8/15/2021  5:36 AM        Passed - Blood pressure under 140/90 in past 12 months     BP Readings from Last 3 Encounters:   07/21/21 128/80   12/09/20 115/73   07/24/19 124/80                 Passed - Patient is age 6 or older        Passed - Recent (12 mo) or future (30 days) visit within the authorizing provider's specialty     Patient has had an office visit with the authorizing provider or a provider within the authorizing providers department within the previous 12 mos or has a future within next 30 days. See \"Patient Info\" tab in inbasket, or \"Choose Columns\" in Meds & Orders section of the refill encounter.              Passed - Medication is active on med list           lisinopril (ZESTRIL) 10 MG tablet [Pharmacy Med Name: LISINOPRIL TABS 10MG] 90 tablet 3     Sig: TAKE 1 TABLET BY MOUTH DAILY       ACE Inhibitors (Including Combos) Protocol Failed - 8/15/2021  5:36 AM        Failed - Normal serum creatinine on file in past 12 months     Recent Labs   Lab Test 06/24/20  0819   CR 0.79       Ok to refill medication if creatinine is low          Failed - Normal serum potassium on file in past 12 months     Recent Labs   Lab Test 06/24/20  0819   POTASSIUM 4.1             Passed - Blood pressure under 140/90 in past 12 months     BP Readings from Last 3 Encounters:   07/21/21 128/80   12/09/20 115/73   07/24/19 124/80                 Passed - Recent (12 mo) or future (30 days) visit within the authorizing provider's specialty     Patient has had an office visit with the authorizing provider or a provider within the authorizing providers department within the previous 12 mos or has a future within next 30 days. See \"Patient " "Info\" tab in inbasket, or \"Choose Columns\" in Meds & Orders section of the refill encounter.              Passed - Medication is active on med list        Passed - Patient is age 18 or older             "

## 2021-08-17 NOTE — TELEPHONE ENCOUNTER
One refill given.  Needs to be seen in clinic in person for further refills.  Homero Sanabria MD  Family Medicine

## 2021-09-12 ENCOUNTER — HEALTH MAINTENANCE LETTER (OUTPATIENT)
Age: 61
End: 2021-09-12

## 2021-10-08 ENCOUNTER — LAB (OUTPATIENT)
Dept: LAB | Facility: CLINIC | Age: 61
End: 2021-10-08
Payer: COMMERCIAL

## 2021-10-08 DIAGNOSIS — I10 ESSENTIAL HYPERTENSION: ICD-10-CM

## 2021-10-08 DIAGNOSIS — E78.5 HYPERLIPIDEMIA LDL GOAL <100: ICD-10-CM

## 2021-10-08 DIAGNOSIS — E11.9 TYPE 2 DIABETES MELLITUS WITHOUT COMPLICATION, WITHOUT LONG-TERM CURRENT USE OF INSULIN (H): ICD-10-CM

## 2021-10-08 DIAGNOSIS — Z12.5 SCREENING FOR PROSTATE CANCER: ICD-10-CM

## 2021-10-08 LAB
ANION GAP SERPL CALCULATED.3IONS-SCNC: 8 MMOL/L (ref 3–14)
BUN SERPL-MCNC: 15 MG/DL (ref 7–30)
CALCIUM SERPL-MCNC: 9.3 MG/DL (ref 8.5–10.1)
CHLORIDE BLD-SCNC: 100 MMOL/L (ref 94–109)
CHOLEST SERPL-MCNC: 154 MG/DL
CO2 SERPL-SCNC: 28 MMOL/L (ref 20–32)
CREAT SERPL-MCNC: 0.77 MG/DL (ref 0.66–1.25)
CREAT UR-MCNC: 122 MG/DL
FASTING STATUS PATIENT QL REPORTED: YES
GFR SERPL CREATININE-BSD FRML MDRD: >90 ML/MIN/1.73M2
GLUCOSE BLD-MCNC: 282 MG/DL (ref 70–99)
HBA1C MFR BLD: 9.2 % (ref 0–5.6)
HDLC SERPL-MCNC: 37 MG/DL
LDLC SERPL CALC-MCNC: 74 MG/DL
LDLC SERPL CALC-MCNC: ABNORMAL MG/DL
MICROALBUMIN UR-MCNC: 18 MG/L
MICROALBUMIN/CREAT UR: 14.75 MG/G CR (ref 0–17)
NONHDLC SERPL-MCNC: 117 MG/DL
POTASSIUM BLD-SCNC: 4.3 MMOL/L (ref 3.4–5.3)
PSA SERPL-MCNC: 0.77 UG/L (ref 0–4)
SODIUM SERPL-SCNC: 136 MMOL/L (ref 133–144)
TRIGL SERPL-MCNC: 412 MG/DL

## 2021-10-08 PROCEDURE — G0103 PSA SCREENING: HCPCS

## 2021-10-08 PROCEDURE — 83721 ASSAY OF BLOOD LIPOPROTEIN: CPT | Mod: 59

## 2021-10-08 PROCEDURE — 82043 UR ALBUMIN QUANTITATIVE: CPT

## 2021-10-08 PROCEDURE — 80048 BASIC METABOLIC PNL TOTAL CA: CPT

## 2021-10-08 PROCEDURE — 36415 COLL VENOUS BLD VENIPUNCTURE: CPT

## 2021-10-08 PROCEDURE — 83036 HEMOGLOBIN GLYCOSYLATED A1C: CPT

## 2021-10-08 PROCEDURE — 80061 LIPID PANEL: CPT

## 2021-10-11 NOTE — PROGRESS NOTES
Roderick is a 61 year old who is being evaluated via a billable telephone visit.      What phone number would you like to be contacted at? [phone]  How would you like to obtain your AVS? [facility]    Assessment & Plan     Uncontrolled type 2 diabetes mellitus with hyperglycemia (H)  Reviewed diet and lifestyle, stay active for weight loss, increase in medication  - glipiZIDE (GLUCOTROL XL) 10 MG 24 hr tablet; Take 1 tablet (10 mg) by mouth daily  - Hemoglobin A1c; Future    Morbid obesity (H)  Referral done for ED, oral meds not working and co morbidities  - Adult Urology Referral; Future    Hyperlipidemia LDL goal <100  Refilled med controlled and reviewed labs  - atorvastatin (LIPITOR) 40 MG tablet; Take 1 tablet (40 mg) by mouth daily    Coronary artery disease due to lipid rich plaque  Stable on meds  - atorvastatin (LIPITOR) 40 MG tablet; Take 1 tablet (40 mg) by mouth daily  - metoprolol tartrate (LOPRESSOR) 25 MG tablet; Take 0.5 tablets (12.5 mg) by mouth 2 times daily  - Adult Urology Referral; Future    Essential hypertension  Refilled med  - lisinopril (ZESTRIL) 10 MG tablet; Take 1 tablet (10 mg) by mouth daily  - metoprolol tartrate (LOPRESSOR) 25 MG tablet; Take 0.5 tablets (12.5 mg) by mouth 2 times daily  - Adult Urology Referral; Future    Erectile dysfunction, unspecified erectile dysfunction type  Referral is done  - Adult Urology Referral; Future    Type 2 diabetes mellitus with other circulatory complication, without long-term current use of insulin (H)    - glipiZIDE (GLUCOTROL XL) 10 MG 24 hr tablet; Take 1 tablet (10 mg) by mouth daily  - Hemoglobin A1c; Future    Benign prostatic hyperplasia with urinary frequency  Symptoms, reviewed diagnosis, med, handouts given  - tamsulosin (FLOMAX) 0.4 MG capsule; Take 1 capsule (0.4 mg) by mouth daily    Colon cancer screening    - Fecal colorectal cancer screen (FIT); Future             BMI:   Estimated body mass index is 38.22 kg/m  as calculated  "from the following:    Height as of 12/9/20: 1.791 m (5' 10.5\").    Weight as of 7/21/21: 122.6 kg (270 lb 3.2 oz).   Weight management plan: diet and activity for weight loss    See Patient Instructions    Return in about 3 months (around 1/12/2022) for Office Visit.    Homero Sanabria MD  [facility]    Emeka Vaughn is a 61 year old who presents for the following health issues     HPI       Will mail FIT kit.  Diabetes Follow-up      How often are you checking your blood sugar? Not at all    What concerns do you have today about your diabetes? None     Do you have any of these symptoms? (Select all that apply)  Excessive thirst    Have you had a diabetic eye exam in the last 12 months? No                Hyperlipidemia Follow-Up      Are you regularly taking any medication or supplement to lower your cholesterol?   Yes- .    Are you having muscle aches or other side effects that you think could be caused by your cholesterol lowering medication?  No    Hypertension Follow-up      Do you check your blood pressure regularly outside of the clinic? No     Are you following a low salt diet? No    Are your blood pressures ever more than 140 on the top number (systolic) OR more   than 90 on the bottom number (diastolic), for example 140/90? Yes    BP Readings from Last 2 Encounters:   07/21/21 128/80   12/09/20 115/73     Hemoglobin A1C (%)   Date Value   10/08/2021 9.2 (H)   12/16/2020 8.0 (H)   06/24/2020 7.5 (H)     LDL Cholesterol Calculated   Date Value   10/08/2021      Comment:     Cannot estimate LDL when triglyceride exceeds 400 mg/dL   06/24/2020     Cannot estimate LDL when triglyceride exceeds 400 mg/dL mg/dL   03/20/2019     Cannot estimate LDL when triglyceride exceeds 400 mg/dL mg/dL     LDL Cholesterol Direct (mg/dL)   Date Value   10/08/2021 74   06/24/2020 73   03/20/2019 59             Review of Systems         Objective           Vitals:  No vitals were obtained today " due to virtual visit.    Physical Exam   healthy, alert and no distress  PSYCH: Alert and oriented times 3; coherent speech, normal   rate and volume, able to articulate logical thoughts, able   to abstract reason, no tangential thoughts, no hallucinations   or delusions  His affect is normal  RESP: No cough, no audible wheezing, able to talk in full sentences  Remainder of exam unable to be completed due to telephone visits    Reviewed labs from yesterday            Phone call duration: 15 minutes

## 2021-10-12 ENCOUNTER — VIRTUAL VISIT (OUTPATIENT)
Dept: FAMILY MEDICINE | Facility: CLINIC | Age: 61
End: 2021-10-12
Payer: COMMERCIAL

## 2021-10-12 DIAGNOSIS — Z12.11 COLON CANCER SCREENING: Primary | ICD-10-CM

## 2021-10-12 DIAGNOSIS — E78.5 HYPERLIPIDEMIA LDL GOAL <100: ICD-10-CM

## 2021-10-12 DIAGNOSIS — I25.10 CORONARY ARTERY DISEASE DUE TO LIPID RICH PLAQUE: ICD-10-CM

## 2021-10-12 DIAGNOSIS — I25.83 CORONARY ARTERY DISEASE DUE TO LIPID RICH PLAQUE: ICD-10-CM

## 2021-10-12 DIAGNOSIS — E66.01 MORBID OBESITY (H): ICD-10-CM

## 2021-10-12 DIAGNOSIS — I10 ESSENTIAL HYPERTENSION: ICD-10-CM

## 2021-10-12 DIAGNOSIS — E11.65 UNCONTROLLED TYPE 2 DIABETES MELLITUS WITH HYPERGLYCEMIA (H): ICD-10-CM

## 2021-10-12 DIAGNOSIS — E11.59 TYPE 2 DIABETES MELLITUS WITH OTHER CIRCULATORY COMPLICATION, WITHOUT LONG-TERM CURRENT USE OF INSULIN (H): ICD-10-CM

## 2021-10-12 DIAGNOSIS — N40.1 BENIGN PROSTATIC HYPERPLASIA WITH URINARY FREQUENCY: ICD-10-CM

## 2021-10-12 DIAGNOSIS — R35.0 BENIGN PROSTATIC HYPERPLASIA WITH URINARY FREQUENCY: ICD-10-CM

## 2021-10-12 DIAGNOSIS — N52.9 ERECTILE DYSFUNCTION, UNSPECIFIED ERECTILE DYSFUNCTION TYPE: ICD-10-CM

## 2021-10-12 PROCEDURE — 99214 OFFICE O/P EST MOD 30 MIN: CPT | Mod: 95 | Performed by: FAMILY MEDICINE

## 2021-10-12 RX ORDER — ATORVASTATIN CALCIUM 40 MG/1
40 TABLET, FILM COATED ORAL DAILY
Qty: 90 TABLET | Refills: 3 | Status: SHIPPED | OUTPATIENT
Start: 2021-10-12 | End: 2022-11-02

## 2021-10-12 RX ORDER — TAMSULOSIN HYDROCHLORIDE 0.4 MG/1
0.4 CAPSULE ORAL DAILY
Qty: 90 CAPSULE | Refills: 3 | Status: SHIPPED | OUTPATIENT
Start: 2021-10-12 | End: 2022-11-02

## 2021-10-12 RX ORDER — METOPROLOL TARTRATE 25 MG/1
12.5 TABLET, FILM COATED ORAL 2 TIMES DAILY
Qty: 90 TABLET | Refills: 3 | Status: SHIPPED | OUTPATIENT
Start: 2021-10-12 | End: 2022-10-11

## 2021-10-12 RX ORDER — GLIPIZIDE 10 MG/1
10 TABLET, FILM COATED, EXTENDED RELEASE ORAL DAILY
Qty: 180 TABLET | Refills: 3 | Status: SHIPPED | OUTPATIENT
Start: 2021-10-12 | End: 2022-03-16

## 2021-10-12 RX ORDER — LISINOPRIL 10 MG/1
10 TABLET ORAL DAILY
Qty: 90 TABLET | Refills: 3 | Status: SHIPPED | OUTPATIENT
Start: 2021-10-12 | End: 2022-10-11

## 2021-10-12 NOTE — PATIENT INSTRUCTIONS
Please follow up in the office in 3 months.    I increased your glipizide xl to 20 mg a day.    For your prostate issues with urinary frequency please start the tamsulosin 0.4 mg once a day.    Please return the FIT test.        Patient Education     BPH (Enlarged Prostate)   The prostate is a gland at the base of the bladder. As some men get older, the prostate may get bigger. This problem is called benign prostatic hyperplasia (BPH). BPH puts pressure on the urethra. This is the tube that carries urine from the bladder to the penis. It may interfere with the flow of urine. It may also keep the bladder from emptying fully.    Symptoms of BPH include trouble starting urination and feeling as though the bladder isn t emptying all the way. It also includes a weak urine stream, dribbling and leaking of urine, and frequent and urgent urination (especially at night). BPH can increase the risk of urinary infections. It can also block off urine flow completely. If this occurs, a thin tube (catheter) may be passed into the bladder to help drain urine.  If symptoms are mild, no treatment may be needed right now. If symptoms are more severe, treatment is likely needed. The goal of treatment is to improve urine flow and reduce symptoms. Treatments can include medicine and procedures. Your healthcare provider will talk about treatment options with you as needed.  Home care  The following guidelines will help you care for yourself at home:    Urinate as soon as you feel the urge. Don't try to hold your urine.    Don't limit your fluid intake during the day. Drink 6 to 8 glasses of water or liquids a day. This prevents bacteria from building up in the bladder.    Don't drink fluids after dinner. This helps to reduce urination during the night.    Don't take medicines that can make your symptoms worse. These include certain cold and allergy medicines and antidepressants. Diuretics used for high blood pressure can also make  symptoms worse. Talk with your healthcare provider about the medicines you take. Other choices may work better for you.  Prostate cancer screening  BPH does not increase the risk of prostate cancer. But because prostate cancer is a common cancer in men, screening is sometimes advised. This may help find the cancer in its early stages when treatment is most effective. Factors that can increase the risk of prostate cancer include being  or having a father or brother who had prostate cancer. A high-fat diet may also increase the risk of prostate cancer. Talk with your healthcare provider to see if you should be screened for prostate cancer.  Follow-up care  Follow up with your healthcare provider, or as advised  To learn more, go to:    National Kidney & Urologic Diseases Information Clearinghouse kidney.niddk.nih.gov, 296.932.6656  When to get medical advice  Call your healthcare provider right away if any of these occur:    Fever of 100.4 F (38.0 C) or higher, or as advised    Unable to pass urine for 8 hours    More pressure or pain in your lower belly (bladder)    Blood in the urine    Increasing low back pain that is not linked to injury    Symptoms of urinary infection (increased urge to urinate, burning when passing urine, bad-smelling urine)  [employer] last reviewed this educational content on 11/1/2019 2000-2021 [employer]. All rights reserved. This information is not intended as a substitute for professional medical care. Always follow your healthcare professional's instructions.           Patient Education     Tamsulosin Hydrochloride Oral capsule  What is this medicine?  TAMSULOSIN (tovar RIKKI choco sin) is used to treat enlargement of the prostate gland in men, a condition called benign prostatic hyperplasia or BPH. It is not for use in women. It works by relaxing muscles in the prostate and bladder neck. This improves urine flow and reduces BPH symptoms.  This medicine may be  used for other purposes; ask your health care provider or pharmacist if you have questions.  What should I tell my health care provider before I take this medicine?  They need to know if you have any of the following conditions:    advanced kidney disease    advanced liver disease    low blood pressure    prostate cancer    an unusual or allergic reaction to tamsulosin, sulfa drugs, other medicines, foods, dyes, or preservatives    pregnant or trying to get pregnant    breast-feeding  How should I use this medicine?  Take this medicine by mouth about 30 minutes after the same meal every day. Follow the directions on the prescription label. Swallow the capsules whole with a glass of water. Do not crush, chew, or open capsules. Do not take your medicine more often than directed. Do not stop taking your medicine unless your doctor tells you to.  Talk to your pediatrician regarding the use of this medicine in children. Special care may be needed.  Overdosage: If you think you have taken too much of this medicine contact a poison control center or emergency room at once.  NOTE: This medicine is only for you. Do not share this medicine with others.  What if I miss a dose?  If you miss a dose, take it as soon as you can. If it is almost time for your next dose, take only that dose. Do not take double or extra doses. If you stop taking your medicine for several days or more, ask your doctor or health care professional what dose you should start back on.  What may interact with this medicine?    cimetidine    fluoxetine    ketoconazole    medicines for erectile disfunction like sildenafil, tadalafil, vardenafil    medicines for high blood pressure    other alpha-blockers like alfuzosin, doxazosin, phentolamine, phenoxybenzamine, prazosin, terazosin    warfarin  This list may not describe all possible interactions. Give your health care provider a list of all the medicines, herbs, non-prescription drugs, or dietary  supplements you use. Also tell them if you smoke, drink alcohol, or use illegal drugs. Some items may interact with your medicine.  What should I watch for while using this medicine?  Visit your doctor or health care professional for regular check ups. You will need lab work done before you start this medicine and regularly while you are taking it. Check your blood pressure as directed. Ask your health care professional what your blood pressure should be, and when you should contact him or her.  This medicine may make you feel dizzy or lightheaded. This is more likely to happen after the first dose, after an increase in dose, or during hot weather or exercise. Drinking alcohol and taking some medicines can make this worse. Do not drive, use machinery, or do anything that needs mental alertness until you know how this medicine affects you. Do not sit or stand up quickly. If you begin to feel dizzy, sit down until you feel better. These effects can decrease once your body adjusts to the medicine.  Contact your doctor or health care professional right away if you have an erection that lasts longer than 4 hours or if it becomes painful. This may be a sign of a serious problem and must be treated right away to prevent permanent damage.  If you are thinking of having cataract surgery, tell your eye surgeon that you have taken this medicine.  What side effects may I notice from receiving this medicine?  Side effects that you should report to your doctor or health care professional as soon as possible:    allergic reactions like skin rash or itching, hives, swelling of the lips, mouth, tongue, or throat    breathing problems    change in vision    feeling faint or lightheaded    irregular heartbeat    prolonged or painful erection    weakness  Side effects that usually do not require medical attention (report to your doctor or health care professional if they continue or are bothersome):    back pain    change in sex drive  or performance    constipation, nausea or vomiting    cough    drowsy    runny or stuffy nose    trouble sleeping  This list may not describe all possible side effects. Call your doctor for medical advice about side effects. You may report side effects to FDA at 9-268-ZPV-2107.  Where should I keep my medicine?  Keep out of the reach of children.  Store at room temperature between 15 and 30 degrees C (59 and 86 degrees F). Throw away any unused medicine after the expiration date.  NOTE:This sheet is a summary. It may not cover all possible information. If you have questions about this medicine, talk to your doctor, pharmacist, or health care provider. Copyright  2016 [employer]

## 2021-10-26 ENCOUNTER — LAB (OUTPATIENT)
Dept: LAB | Facility: CLINIC | Age: 61
End: 2021-10-26
Payer: COMMERCIAL

## 2021-10-26 DIAGNOSIS — Z12.11 COLON CANCER SCREENING: ICD-10-CM

## 2021-10-26 PROCEDURE — 82274 ASSAY TEST FOR BLOOD FECAL: CPT

## 2021-10-29 LAB — HEMOCCULT STL QL IA: NEGATIVE

## 2022-01-02 ENCOUNTER — HEALTH MAINTENANCE LETTER (OUTPATIENT)
Age: 62
End: 2022-01-02

## 2022-03-02 ENCOUNTER — LAB (OUTPATIENT)
Dept: LAB | Facility: CLINIC | Age: 62
End: 2022-03-02
Payer: COMMERCIAL

## 2022-03-02 DIAGNOSIS — E11.59 TYPE 2 DIABETES MELLITUS WITH OTHER CIRCULATORY COMPLICATION, WITHOUT LONG-TERM CURRENT USE OF INSULIN (H): ICD-10-CM

## 2022-03-02 DIAGNOSIS — E11.65 UNCONTROLLED TYPE 2 DIABETES MELLITUS WITH HYPERGLYCEMIA (H): ICD-10-CM

## 2022-03-02 LAB — HBA1C MFR BLD: 9 % (ref 0–5.6)

## 2022-03-02 PROCEDURE — 36415 COLL VENOUS BLD VENIPUNCTURE: CPT

## 2022-03-02 PROCEDURE — 83036 HEMOGLOBIN GLYCOSYLATED A1C: CPT

## 2022-03-16 ENCOUNTER — OFFICE VISIT (OUTPATIENT)
Dept: FAMILY MEDICINE | Facility: CLINIC | Age: 62
End: 2022-03-16
Payer: COMMERCIAL

## 2022-03-16 VITALS
TEMPERATURE: 97.9 F | DIASTOLIC BLOOD PRESSURE: 78 MMHG | WEIGHT: 262 LBS | HEIGHT: 71 IN | RESPIRATION RATE: 14 BRPM | BODY MASS INDEX: 36.68 KG/M2 | SYSTOLIC BLOOD PRESSURE: 124 MMHG | HEART RATE: 78 BPM | OXYGEN SATURATION: 97 %

## 2022-03-16 DIAGNOSIS — M25.561 ACUTE PAIN OF RIGHT KNEE: ICD-10-CM

## 2022-03-16 DIAGNOSIS — N52.9 ERECTILE DYSFUNCTION, UNSPECIFIED ERECTILE DYSFUNCTION TYPE: ICD-10-CM

## 2022-03-16 DIAGNOSIS — E66.01 MORBID OBESITY (H): ICD-10-CM

## 2022-03-16 DIAGNOSIS — E78.5 HYPERLIPIDEMIA LDL GOAL <100: ICD-10-CM

## 2022-03-16 DIAGNOSIS — E11.65 UNCONTROLLED TYPE 2 DIABETES MELLITUS WITH HYPERGLYCEMIA (H): ICD-10-CM

## 2022-03-16 DIAGNOSIS — E11.59 TYPE 2 DIABETES MELLITUS WITH OTHER CIRCULATORY COMPLICATION, WITHOUT LONG-TERM CURRENT USE OF INSULIN (H): ICD-10-CM

## 2022-03-16 DIAGNOSIS — E11.9 TYPE 2 DIABETES MELLITUS WITHOUT COMPLICATION, WITHOUT LONG-TERM CURRENT USE OF INSULIN (H): Primary | ICD-10-CM

## 2022-03-16 DIAGNOSIS — L30.9 ECZEMA, UNSPECIFIED TYPE: ICD-10-CM

## 2022-03-16 PROCEDURE — 99214 OFFICE O/P EST MOD 30 MIN: CPT | Performed by: FAMILY MEDICINE

## 2022-03-16 RX ORDER — GLIPIZIDE 10 MG/1
10 TABLET, FILM COATED, EXTENDED RELEASE ORAL DAILY
COMMUNITY
Start: 2022-03-16 | End: 2022-05-19

## 2022-03-16 RX ORDER — NEOMYCIN SULFATE, POLYMYXIN B SULFATE AND HYDROCORTISONE 10; 3.5; 1 MG/ML; MG/ML; [USP'U]/ML
3 SUSPENSION/ DROPS AURICULAR (OTIC) 3 TIMES DAILY
Qty: 10 ML | Refills: 3 | Status: SHIPPED | OUTPATIENT
Start: 2022-03-16 | End: 2024-01-10

## 2022-03-16 ASSESSMENT — PAIN SCALES - GENERAL: PAINLEVEL: MODERATE PAIN (4)

## 2022-03-16 NOTE — PATIENT INSTRUCTIONS
Please start the generic jardiance 10 mg once a day.    Follow up with a lab visit in 3 months, lab only.    If you want to see urology for ED, please let me know and I will send a referral to discuss other options.          Thank you for choosing [facility].  You may be receiving an email and/or telephone survey request from [facility] Customer Experience regarding your visit today.  Please take a few minutes to respond to the survey to let us know how we are doing.      If you have questions or concerns, please contact us via [employer] or you can contact your care team at [phone] option 2.    Our Clinic hours are:  Monday - Thursday 7am-6pm  Friday 7am-5pm    The [place] outpatient lab hours are:  Monday - Friday 7am-4:30pm    Appointments are required, call [phone]    If you have clinical questions after hours or would like to schedule an appointment,  call the clinic at [phone].

## 2022-03-16 NOTE — PROGRESS NOTES
Assessment & Plan     Type 2 diabetes mellitus without complication, without long-term current use of insulin (H)  Not controlled, add medication, due to prior CAD add this med recheck in 3 months  - empagliflozin (JARDIANCE) 10 MG TABS tablet; Take 1 tablet (10 mg) by mouth daily  - empagliflozin (JARDIANCE) 10 MG TABS tablet; Take 1 tablet (10 mg) by mouth daily  - Hemoglobin A1c; Future    Uncontrolled type 2 diabetes mellitus with hyperglycemia (H)    - empagliflozin (JARDIANCE) 10 MG TABS tablet; Take 1 tablet (10 mg) by mouth daily  - empagliflozin (JARDIANCE) 10 MG TABS tablet; Take 1 tablet (10 mg) by mouth daily  - glipiZIDE (GLUCOTROL XL) 10 MG 24 hr tablet; Take 1 tablet (10 mg) by mouth daily Twice daily  - Hemoglobin A1c; Future    Morbid obesity (H)  Working on weight to help with DM    Hyperlipidemia LDL goal <100      Type 2 diabetes mellitus with other circulatory complication, without long-term current use of insulin (H)    - glipiZIDE (GLUCOTROL XL) 10 MG 24 hr tablet; Take 1 tablet (10 mg) by mouth daily Twice daily    Erectile dysfunction, unspecified erectile dysfunction type  Oral med not working. Will consider referral to urology in the future    Eczema, unspecified type  Refilled med, instructed that his diagnosis is chronic  - neomycin-polymyxin-hydrocortisone (CORTISPORIN) 3.5-36179-9 otic suspension; Place 3 drops into both ears 3 times daily Prn.  Hold on file until needed.    Acute pain of right knee  New issue, 7 weeks of right knee pain, will do referral to sports med  - Orthopedic  Referral; Future             Tobacco Cessation:   reports that he has been smoking cigars. He has never used smokeless tobacco.  Tobacco Cessation Action Plan: Information offered: Patient not interested at this time    See Patient Instructions    Return in about 3 months (around 6/16/2022) for Lab Work.    Homero Sanabria MD  [facility]    Emeka Vaughn is a 61  year old who presents for the following health issues     [facility], other [facility] for just today to [facility]   Discuss the cortisporin.   Discuss cialis.     History of Present Illness       Diabetes:   He presents for follow up of diabetes.  He is not checking blood glucose. He has no concerns regarding his diabetes at this time.  He is not experiencing numbness or burning in feet, excessive thirst, blurry vision, weight changes or redness, sores or blisters on feet. The patient has not had a diabetic eye exam in the last 12 months.         He eats 2-3 servings of fruits and vegetables daily.He consumes 1 sweetened beverage(s) daily.He exercises with enough effort to increase his heart rate 9 or less minutes per day.  He exercises with enough effort to increase his heart rate 3 or less days per week. He is missing 1 dose(s) of medications per week.         Hyperlipidemia Follow-Up      Are you regularly taking any medication or supplement to lower your cholesterol?   Yes- atorvastatin    Are you having muscle aches or other side effects that you think could be caused by your cholesterol lowering medication?  No    Hypertension Follow-up      Do you check your blood pressure regularly outside of the clinic? No     Are you following a low salt diet? Yes    Are your blood pressures ever more than 140 on the top number (systolic) OR more   than 90 on the bottom number (diastolic), for example 140/90? No    BP Readings from Last 2 Encounters:   03/16/22 124/78   07/21/21 128/80     Hemoglobin A1C POCT (%)   Date Value   12/16/2020 8.0 (H)   06/24/2020 7.5 (H)     Hemoglobin A1C (%)   Date Value   03/02/2022 9.0 (H)   10/08/2021 9.2 (H)     LDL Cholesterol Calculated   Date Value   10/08/2021      Comment:     Cannot estimate LDL when triglyceride exceeds 400 mg/dL   06/24/2020     Cannot estimate LDL when triglyceride exceeds 400 mg/dL mg/dL   03/20/2019     Cannot estimate LDL when  "triglyceride exceeds 400 mg/dL mg/dL     LDL Cholesterol Direct (mg/dL)   Date Value   10/08/2021 74   06/24/2020 73   03/20/2019 59         Pain History:  When did you first notice your pain? Slid on ice in January   Have you seen any provider previously for this issue? No  How has your pain affected your ability to work? Not currently working - unrelated to pain  Where in your body do you have pain? Musculoskeletal problem/pain  Onset/Duration: January   Description  Location: knee - right  Joint Swelling: no  Redness: no  Pain: YES- mild- moderate   Warmth: YES  Intensity:  moderate  Progression of Symptoms:  same  Accompanying signs and symptoms:   Fevers: no  Numbness/tingling/weakness: no  History  Trauma to the area: YES- took a fall on ice in Jan.   Recent illness:  no  Previous similar problem: YES  Previous evaluation:  YES  Precipitating or alleviating factors:  Aggravating factors include: sitting (aches after sitting) and climbing stairs  Therapies tried and outcome: Ibuprofen and copperfit        Review of Systems         Objective    /78   Pulse 78   Temp 97.9  F (36.6  C) (Tympanic)   Resp 14   Ht 1.791 m (5' 10.5\")   Wt 118.8 kg (262 lb)   SpO2 97%   BMI 37.06 kg/m    Body mass index is 37.06 kg/m .  Physical Exam   GENERAL APPEARANCE: healthy, alert and no distress  RESP: lungs clear to auscultation - no rales, rhonchi or wheezes  CV: regular rates and rhythm, normal S1 S2, no S3 or S4 and no murmur, click or rub  ABDOMEN: soft, nontender, without hepatosplenomegaly or masses and bowel sounds normal  MS: extremities normal- no gross deformities noted  SKIN: no suspicious lesions or rashes  NEURO: Normal strength and tone, mentation intact and speech normal  DIABETIC FOOT EXAM: normal DP and PT pulses, no trophic changes or ulcerative lesions and normal sensory exam  PSYCH: mentation appears normal and affect normal/bright                "

## 2022-05-01 ENCOUNTER — TRANSFERRED RECORDS (OUTPATIENT)
Dept: HEALTH INFORMATION MANAGEMENT | Facility: CLINIC | Age: 62
End: 2022-05-01

## 2022-05-01 LAB — RETINOPATHY: NORMAL

## 2022-05-19 ENCOUNTER — TELEPHONE (OUTPATIENT)
Dept: FAMILY MEDICINE | Facility: CLINIC | Age: 62
End: 2022-05-19
Payer: COMMERCIAL

## 2022-05-19 DIAGNOSIS — E11.65 UNCONTROLLED TYPE 2 DIABETES MELLITUS WITH HYPERGLYCEMIA (H): ICD-10-CM

## 2022-05-19 DIAGNOSIS — E11.59 TYPE 2 DIABETES MELLITUS WITH OTHER CIRCULATORY COMPLICATION, WITHOUT LONG-TERM CURRENT USE OF INSULIN (H): ICD-10-CM

## 2022-05-19 NOTE — TELEPHONE ENCOUNTER
Under meds it says it was filled on 3/16/2022 but does not say how much is Dispensed or how many refills. He also said the pharmacy is confused on 5mg or 10mg so he [facility].    Natalia Vincent PSC on 5/19/2022 at 1:33 PM

## 2022-05-20 RX ORDER — GLIPIZIDE 10 MG/1
10 TABLET, FILM COATED, EXTENDED RELEASE ORAL 2 TIMES DAILY
Qty: 180 TABLET | Refills: 1 | Status: SHIPPED | OUTPATIENT
Start: 2022-05-20 | End: 2022-11-01

## 2022-05-20 NOTE — TELEPHONE ENCOUNTER
Documentation from Dr. Sanabria's last note is confusing as the glipizide had been 10 mg once daily which was increased 10/12/2021.  However, on 3/16/2022 in the med note it says patient was taking 10 mg twice daily.  Please asked patient how he has been taking the medication

## 2022-05-20 NOTE — TELEPHONE ENCOUNTER
Patient called back.  He is taking Glipizide 10 mg BID and Jardiance 10 mg daily.  Patient [facility]  Patient was reminded of need for lab only visit around 6/16/22 and DM visit around  9/16/22.  Patient verbalized understanding and agrees.  Routed to Dr Quinteros.  Najma Epstein RN

## 2022-06-15 ENCOUNTER — LAB (OUTPATIENT)
Dept: LAB | Facility: CLINIC | Age: 62
End: 2022-06-15
Payer: COMMERCIAL

## 2022-06-15 DIAGNOSIS — E11.9 TYPE 2 DIABETES MELLITUS WITHOUT COMPLICATION, WITHOUT LONG-TERM CURRENT USE OF INSULIN (H): ICD-10-CM

## 2022-06-15 DIAGNOSIS — E11.65 UNCONTROLLED TYPE 2 DIABETES MELLITUS WITH HYPERGLYCEMIA (H): ICD-10-CM

## 2022-06-15 LAB — HBA1C MFR BLD: 7.3 % (ref 0–5.6)

## 2022-06-15 PROCEDURE — 83036 HEMOGLOBIN GLYCOSYLATED A1C: CPT

## 2022-06-15 PROCEDURE — 36415 COLL VENOUS BLD VENIPUNCTURE: CPT

## 2022-09-30 ENCOUNTER — MYC MEDICAL ADVICE (OUTPATIENT)
Dept: FAMILY MEDICINE | Facility: CLINIC | Age: 62
End: 2022-09-30

## 2022-09-30 DIAGNOSIS — E78.5 HYPERLIPIDEMIA LDL GOAL <100: ICD-10-CM

## 2022-09-30 DIAGNOSIS — Z12.5 SCREENING FOR PROSTATE CANCER: ICD-10-CM

## 2022-09-30 DIAGNOSIS — E11.59 TYPE 2 DIABETES MELLITUS WITH OTHER CIRCULATORY COMPLICATION, WITHOUT LONG-TERM CURRENT USE OF INSULIN (H): Primary | ICD-10-CM

## 2022-09-30 DIAGNOSIS — I10 ESSENTIAL HYPERTENSION: ICD-10-CM

## 2022-09-30 DIAGNOSIS — E78.1 HYPERTRIGLYCERIDEMIA: ICD-10-CM

## 2022-10-03 NOTE — TELEPHONE ENCOUNTER
Dr Sanabria  [facility]  Is is appropriate for this pt to be seen yearly with a 6 mo lab check or would you like to see him q 6 mo?        Tyler Car RN

## 2022-10-03 NOTE — TELEPHONE ENCOUNTER
Please do a fasting lab visit followed by a virtual visit with me for Roderick in this case.  Homero Sanabria MD  Family Medicine

## 2022-10-03 NOTE — TELEPHONE ENCOUNTER
Message given to patient with good understanding.  Scheduled OV with Dr. Henao for 11/2/22. [place] Villraeal on 10/3/2022 at 3:05 PM

## 2022-10-10 DIAGNOSIS — I25.10 CORONARY ARTERY DISEASE DUE TO LIPID RICH PLAQUE: ICD-10-CM

## 2022-10-10 DIAGNOSIS — I25.83 CORONARY ARTERY DISEASE DUE TO LIPID RICH PLAQUE: ICD-10-CM

## 2022-10-10 DIAGNOSIS — I10 ESSENTIAL HYPERTENSION: ICD-10-CM

## 2022-10-11 RX ORDER — LISINOPRIL 10 MG/1
TABLET ORAL
Qty: 90 TABLET | Refills: 0 | Status: SHIPPED | OUTPATIENT
Start: 2022-10-11 | End: 2022-11-02

## 2022-10-11 RX ORDER — METOPROLOL TARTRATE 25 MG/1
TABLET, FILM COATED ORAL
Qty: 90 TABLET | Refills: 0 | Status: SHIPPED | OUTPATIENT
Start: 2022-10-11 | End: 2022-11-02

## 2022-10-11 NOTE — TELEPHONE ENCOUNTER
"Requested Prescriptions   Pending Prescriptions Disp Refills    metoprolol tartrate (LOPRESSOR) 25 MG tablet [Pharmacy Med Name: METOPROLOL TARTRATE TABS 25MG] 90 tablet 3     Sig: TAKE ONE-HALF (1/2) TABLET TWICE A DAY       Beta-Blockers Protocol Passed - 10/10/2022 12:03 AM        Passed - Blood pressure under 140/90 in past 12 months     BP Readings from Last 3 Encounters:   03/16/22 124/78   07/21/21 128/80   12/09/20 115/73                 Passed - Patient is age 6 or older        Passed - Recent (12 mo) or future (30 days) visit within the authorizing provider's specialty     Patient has had an office visit with the authorizing provider or a provider within the authorizing providers department within the previous 12 mos or has a future within next 30 days. See \"Patient Info\" tab in inbasket, or \"Choose Columns\" in Meds & Orders section of the refill encounter.              Passed - Medication is active on med list          lisinopril (ZESTRIL) 10 MG tablet [Pharmacy Med Name: LISINOPRIL TABS 10MG] 90 tablet 3     Sig: TAKE 1 TABLET DAILY       ACE Inhibitors (Including Combos) Protocol Failed - 10/10/2022 12:03 AM        Failed - Normal serum creatinine on file in past 12 months     Recent Labs   Lab Test 10/08/21  0943   CR 0.77       Ok to refill medication if creatinine is low          Failed - Normal serum potassium on file in past 12 months     Recent Labs   Lab Test 10/08/21  0943   POTASSIUM 4.3             Passed - Blood pressure under 140/90 in past 12 months     BP Readings from Last 3 Encounters:   03/16/22 124/78   07/21/21 128/80   12/09/20 115/73                 Passed - Recent (12 mo) or future (30 days) visit within the authorizing provider's specialty     Patient has had an office visit with the authorizing provider or a provider within the authorizing providers department within the previous 12 mos or has a future within next 30 days. See \"Patient Info\" tab in inbasket, or \"Choose Columns\" " in Meds & Orders section of the refill encounter.              Passed - Medication is active on med list        Passed - Patient is age 18 or older

## 2022-10-17 ENCOUNTER — MYC MEDICAL ADVICE (OUTPATIENT)
Dept: FAMILY MEDICINE | Facility: CLINIC | Age: 62
End: 2022-10-17

## 2022-10-19 ENCOUNTER — LAB (OUTPATIENT)
Dept: LAB | Facility: CLINIC | Age: 62
End: 2022-10-19
Payer: COMMERCIAL

## 2022-10-19 DIAGNOSIS — I10 ESSENTIAL HYPERTENSION: ICD-10-CM

## 2022-10-19 DIAGNOSIS — E78.5 HYPERLIPIDEMIA LDL GOAL <100: ICD-10-CM

## 2022-10-19 DIAGNOSIS — E11.59 TYPE 2 DIABETES MELLITUS WITH OTHER CIRCULATORY COMPLICATION, WITHOUT LONG-TERM CURRENT USE OF INSULIN (H): ICD-10-CM

## 2022-10-19 DIAGNOSIS — Z12.5 SCREENING FOR PROSTATE CANCER: ICD-10-CM

## 2022-10-19 LAB
ANION GAP SERPL CALCULATED.3IONS-SCNC: 5 MMOL/L (ref 3–14)
BUN SERPL-MCNC: 19 MG/DL (ref 7–30)
CALCIUM SERPL-MCNC: 9.7 MG/DL (ref 8.5–10.1)
CHLORIDE BLD-SCNC: 102 MMOL/L (ref 94–109)
CHOLEST SERPL-MCNC: 175 MG/DL
CO2 SERPL-SCNC: 28 MMOL/L (ref 20–32)
CREAT SERPL-MCNC: 0.87 MG/DL (ref 0.66–1.25)
CREAT UR-MCNC: 74 MG/DL
FASTING STATUS PATIENT QL REPORTED: YES
GFR SERPL CREATININE-BSD FRML MDRD: >90 ML/MIN/1.73M2
GLUCOSE BLD-MCNC: 174 MG/DL (ref 70–99)
HBA1C MFR BLD: 6.8 % (ref 0–5.6)
HDLC SERPL-MCNC: 38 MG/DL
LDLC SERPL CALC-MCNC: 81 MG/DL
LDLC SERPL CALC-MCNC: ABNORMAL MG/DL
MICROALBUMIN UR-MCNC: 8 MG/L
MICROALBUMIN/CREAT UR: 10.81 MG/G CR (ref 0–17)
NONHDLC SERPL-MCNC: 137 MG/DL
POTASSIUM BLD-SCNC: 4.2 MMOL/L (ref 3.4–5.3)
PSA SERPL-MCNC: 0.72 UG/L (ref 0–4)
SODIUM SERPL-SCNC: 135 MMOL/L (ref 133–144)
TRIGL SERPL-MCNC: 532 MG/DL

## 2022-10-19 PROCEDURE — 80061 LIPID PANEL: CPT

## 2022-10-19 PROCEDURE — 83036 HEMOGLOBIN GLYCOSYLATED A1C: CPT

## 2022-10-19 PROCEDURE — 83721 ASSAY OF BLOOD LIPOPROTEIN: CPT | Mod: 59

## 2022-10-19 PROCEDURE — 36415 COLL VENOUS BLD VENIPUNCTURE: CPT

## 2022-10-19 PROCEDURE — 82043 UR ALBUMIN QUANTITATIVE: CPT

## 2022-10-19 PROCEDURE — 80048 BASIC METABOLIC PNL TOTAL CA: CPT

## 2022-10-19 PROCEDURE — G0103 PSA SCREENING: HCPCS

## 2022-10-31 DIAGNOSIS — E11.59 TYPE 2 DIABETES MELLITUS WITH OTHER CIRCULATORY COMPLICATION, WITHOUT LONG-TERM CURRENT USE OF INSULIN (H): ICD-10-CM

## 2022-10-31 DIAGNOSIS — E11.65 UNCONTROLLED TYPE 2 DIABETES MELLITUS WITH HYPERGLYCEMIA (H): ICD-10-CM

## 2022-11-01 RX ORDER — GLIPIZIDE 10 MG/1
TABLET, FILM COATED, EXTENDED RELEASE ORAL
Qty: 180 TABLET | Refills: 0 | Status: SHIPPED | OUTPATIENT
Start: 2022-11-01 | End: 2022-11-02

## 2022-11-01 NOTE — TELEPHONE ENCOUNTER
"Prescription approved per [facility] Refill Protocol.    Appointment scheduled 11/2/22    Pending Prescriptions:                       Disp   Refills    glipiZIDE (GLUCOTROL XL) 10 MG 24 hr tabl*180 ta*3            Sig: TAKE 1 TABLET TWICE A DAY        Requested Prescriptions   Pending Prescriptions Disp Refills     glipiZIDE (GLUCOTROL XL) 10 MG 24 hr tablet [Pharmacy Med Name: GLIPIZIDE ER TABS 10MG] 180 tablet 3     Sig: TAKE 1 TABLET TWICE A DAY       Sulfonylurea Agents Passed - 10/31/2022 12:24 AM        Passed - Patient has documented A1c within the specified period of time.     If HgbA1C is 8 or greater, it needs to be on file within the past 3 months.  If less than 8, must be on file within the past 6 months.     Recent Labs   Lab Test 10/19/22  0840   A1C 6.8*             Passed - Medication is active on med list        Passed - Patient is age 18 or older        Passed - Patient has a recent creatinine (normal) within the past 12 mos.     Recent Labs   Lab Test 10/19/22  0840   CR 0.87       Ok to refill medication if creatinine is low          Passed - Recent (6 mo) or future (30 days) visit within the authorizing provider's specialty     Patient had office visit in the last 6 months or has a visit in the next 30 days with authorizing provider or within the authorizing provider's specialty.  See \"Patient Info\" tab in inbasket, or \"Choose Columns\" in Meds & Orders section of the refill encounter.                 "

## 2022-11-02 ENCOUNTER — LAB (OUTPATIENT)
Dept: LAB | Facility: CLINIC | Age: 62
End: 2022-11-02
Payer: COMMERCIAL

## 2022-11-02 ENCOUNTER — HOSPITAL ENCOUNTER (OUTPATIENT)
Facility: CLINIC | Age: 62
Discharge: HOME | End: 2022-11-02
Admitting: FAMILY MEDICINE
Payer: COMMERCIAL

## 2022-11-02 ENCOUNTER — OFFICE VISIT (OUTPATIENT)
Dept: FAMILY MEDICINE | Facility: CLINIC | Age: 62
End: 2022-11-02
Payer: COMMERCIAL

## 2022-11-02 VITALS
DIASTOLIC BLOOD PRESSURE: 64 MMHG | BODY MASS INDEX: 35.84 KG/M2 | WEIGHT: 256 LBS | RESPIRATION RATE: 16 BRPM | OXYGEN SATURATION: 94 % | SYSTOLIC BLOOD PRESSURE: 104 MMHG | HEIGHT: 71 IN | HEART RATE: 83 BPM | TEMPERATURE: 97.3 F

## 2022-11-02 DIAGNOSIS — E78.5 HYPERLIPIDEMIA LDL GOAL <100: ICD-10-CM

## 2022-11-02 DIAGNOSIS — I10 ESSENTIAL HYPERTENSION: ICD-10-CM

## 2022-11-02 DIAGNOSIS — Z12.11 SCREEN FOR COLON CANCER: ICD-10-CM

## 2022-11-02 DIAGNOSIS — I25.10 CORONARY ARTERY DISEASE DUE TO LIPID RICH PLAQUE: ICD-10-CM

## 2022-11-02 DIAGNOSIS — R06.83 SNORING: ICD-10-CM

## 2022-11-02 DIAGNOSIS — E66.01 MORBID OBESITY (H): ICD-10-CM

## 2022-11-02 DIAGNOSIS — I25.83 CORONARY ARTERY DISEASE DUE TO LIPID RICH PLAQUE: ICD-10-CM

## 2022-11-02 DIAGNOSIS — E11.59 TYPE 2 DIABETES MELLITUS WITH OTHER CIRCULATORY COMPLICATION, WITHOUT LONG-TERM CURRENT USE OF INSULIN (H): Primary | ICD-10-CM

## 2022-11-02 PROCEDURE — 99207 PR FOOT EXAM NO CHARGE: CPT | Mod: 25 | Performed by: FAMILY MEDICINE

## 2022-11-02 PROCEDURE — 99214 OFFICE O/P EST MOD 30 MIN: CPT | Performed by: FAMILY MEDICINE

## 2022-11-02 PROCEDURE — 82274 ASSAY TEST FOR BLOOD FECAL: CPT

## 2022-11-02 RX ORDER — ATORVASTATIN CALCIUM 40 MG/1
40 TABLET, FILM COATED ORAL DAILY
Qty: 90 TABLET | Refills: 3 | Status: SHIPPED | OUTPATIENT
Start: 2022-11-02 | End: 2023-05-05

## 2022-11-02 RX ORDER — METOPROLOL TARTRATE 25 MG/1
12.5 TABLET, FILM COATED ORAL 2 TIMES DAILY
Qty: 90 TABLET | Refills: 3 | Status: SHIPPED | OUTPATIENT
Start: 2022-11-02 | End: 2022-12-19

## 2022-11-02 RX ORDER — MAG HYDROX/ALUMINUM HYD/SIMETH 400-400-40
450 SUSPENSION, ORAL (FINAL DOSE FORM) ORAL DAILY
COMMUNITY
End: 2023-05-03

## 2022-11-02 RX ORDER — LISINOPRIL 10 MG/1
10 TABLET ORAL DAILY
Qty: 90 TABLET | Refills: 3 | Status: SHIPPED | OUTPATIENT
Start: 2022-11-02 | End: 2022-12-19

## 2022-11-02 RX ORDER — CHLORAL HYDRATE 500 MG
2 CAPSULE ORAL DAILY
COMMUNITY

## 2022-11-02 RX ORDER — GLIPIZIDE 10 MG/1
10 TABLET, FILM COATED, EXTENDED RELEASE ORAL 2 TIMES DAILY
Qty: 180 TABLET | Refills: 3 | Status: SHIPPED | OUTPATIENT
Start: 2022-11-02 | End: 2022-12-19

## 2022-11-02 RX ORDER — CYANOCOBALAMIN (VITAMIN B-12) 500 MCG
400 LOZENGE ORAL DAILY
COMMUNITY

## 2022-11-02 ASSESSMENT — PAIN SCALES - GENERAL: PAINLEVEL: NO PAIN (0)

## 2022-11-02 NOTE — PATIENT INSTRUCTIONS
Continue on current cares. Medications refilled today.     Check A1c in 6 months and also follow up visit.

## 2022-11-02 NOTE — PROGRESS NOTES
Assessment & Plan     Type 2 diabetes mellitus with other circulatory complication, without long-term current use of insulin (H)  Current regimen  Glipizide XL 10 mg twice daily  Empagliflozin 10 mg daily  Atorvastatin 40 mg daily  Lisinopril 10 mg daily  Aspirin 81 mg daily  Last A1c of 6.8 on 10/19/22  Last eye exam May of 2022   -- A1c within goal. Continue current medications at this time. Will continue to work on diet for triglycerides. On high intensity statin and fish oil already.   - empagliflozin (JARDIANCE) 10 MG TABS tablet  Dispense: 90 tablet; Refill: 3  - glipiZIDE (GLUCOTROL XL) 10 MG 24 hr tablet  Dispense: 180 tablet; Refill: 3  - Hemoglobin A1c  - FOOT EXAM    Essential hypertension  BP well controlled. Continue current meds   - lisinopril (ZESTRIL) 10 MG tablet  Dispense: 90 tablet; Refill: 3  - metoprolol tartrate (LOPRESSOR) 25 MG tablet  Dispense: 90 tablet; Refill: 3    Hyperlipidemia LDL goal <100  - atorvastatin (LIPITOR) 40 MG tablet  Dispense: 90 tablet; Refill: 3    Coronary artery disease due to lipid rich plaque  - atorvastatin (LIPITOR) 40 MG tablet  Dispense: 90 tablet; Refill: 3  - metoprolol tartrate (LOPRESSOR) 25 MG tablet  Dispense: 90 tablet; Refill: 3    Morbid obesity (H)  BMI 36. Would benefit from weight loss     Screen for colon cancer  - Fecal colorectal cancer screen FIT - Future (S+30)    Snoring  Likely YUNIEL. Advised sleep study. He defers at this time and will think about it.       The risks, benefits and treatment options of prescribed medications or other treatments have been discussed with the patient. The patient verbalized their understanding and should call or follow up if no improvement or if they develop further problems.      Mark Henao, [facility] ARTEM Vaughn is a 62 year old, presenting for the following health issues:  Lipids, Hypertension, and Diabetes      History of Present Illness       Diabetes:   He presents  for follow up of diabetes.  He is not checking blood glucose. He has no concerns regarding his diabetes at this time.  He is not experiencing numbness or burning in feet, excessive thirst, blurry vision, weight changes or redness, sores or blisters on feet. The patient has had a diabetic eye exam in the last 12 months. Eye exam performed on may 2022. Location of [facility] eye [facility].        Hypertension: He presents for follow up of hypertension.  He does not check blood pressure  regularly outside of the clinic. Outpatient blood pressures have not been over 140/90. He follows a low salt diet.         Diabetes Follow-up    DMT2  Glipizide XL 10 mg twice daily  Empagliflozin 10 mg daily  Atorvastatin 40 mg daily  Lisinopril 10 mg daily  Aspirin 81 mg daily  Last A1c of 6.8 on 10/19/22  Last eye exam May of 2022       [place]  Has witness apnea by his wife.   No morning headaches.   Fatigued during the day.   Neck circumference 49 cm.   Defers sleep medicine referral at this time.         BP Readings from Last 2 Encounters:   11/02/22 104/64   03/16/22 124/78     Hemoglobin A1C (%)   Date Value   10/19/2022 6.8 (H)   06/15/2022 7.3 (H)   12/16/2020 8.0 (H)   06/24/2020 7.5 (H)     LDL Cholesterol Calculated   Date Value   10/19/2022      Comment:     Cannot estimate LDL when triglyceride exceeds 400 mg/dL   10/08/2021      Comment:     Cannot estimate LDL when triglyceride exceeds 400 mg/dL   06/24/2020     Cannot estimate LDL when triglyceride exceeds 400 mg/dL mg/dL   03/20/2019     Cannot estimate LDL when triglyceride exceeds 400 mg/dL mg/dL     LDL Cholesterol Direct (mg/dL)   Date Value   10/19/2022 81   10/08/2021 74   06/24/2020 73   03/20/2019 59                 How many servings of fruits and vegetables do you eat daily?  0-1    On average, how many sweetened beverages do you drink each day (Examples: soda, juice, sweet tea, etc.  Do NOT count diet or artificially sweetened beverages)?   0    How many  "days per week do you exercise enough to make your heart beat faster? 3 or less    How many minutes a day do you exercise enough to make your heart beat faster? 9 or less  How many days per week do you miss taking your medication? 1    What makes it hard for you to take your medications?  remembering to take      Review of Systems   Constitutional, HEENT, cardiovascular, pulmonary, gi and gu systems are negative, except as otherwise noted.      Objective    /64 (BP Location: Left arm, Patient Position: Chair, Cuff Size: Adult Large)   Pulse 83   Temp 97.3  F (36.3  C) (Tympanic)   Resp 16   Ht 1.791 m (5' 10.5\")   Wt 116.1 kg (256 lb)   SpO2 94%   BMI 36.21 kg/m    Body mass index is 36.21 kg/m .  Physical Exam   General: alert, cooperative, no acute distress   CV: RRR, no murmur  Resp: non-labored breathing, clear to auscultation, no wheezing or rales   Abdomen: Soft, non-tender, no guarding.   Extremities: No peripheral edema, calves non-tender.           "

## 2022-11-04 LAB — HEMOCCULT STL QL IA: NEGATIVE

## 2022-11-19 ENCOUNTER — HEALTH MAINTENANCE LETTER (OUTPATIENT)
Age: 62
End: 2022-11-19

## 2022-12-19 ENCOUNTER — TELEPHONE (OUTPATIENT)
Dept: FAMILY MEDICINE | Facility: CLINIC | Age: 62
End: 2022-12-19

## 2022-12-19 DIAGNOSIS — I10 ESSENTIAL HYPERTENSION: ICD-10-CM

## 2022-12-19 DIAGNOSIS — I25.10 CORONARY ARTERY DISEASE DUE TO LIPID RICH PLAQUE: ICD-10-CM

## 2022-12-19 DIAGNOSIS — E11.59 TYPE 2 DIABETES MELLITUS WITH OTHER CIRCULATORY COMPLICATION, WITHOUT LONG-TERM CURRENT USE OF INSULIN (H): ICD-10-CM

## 2022-12-19 DIAGNOSIS — I25.83 CORONARY ARTERY DISEASE DUE TO LIPID RICH PLAQUE: ICD-10-CM

## 2022-12-19 RX ORDER — GLIPIZIDE 10 MG/1
10 TABLET, FILM COATED, EXTENDED RELEASE ORAL 2 TIMES DAILY
Qty: 180 TABLET | Refills: 3 | Status: SHIPPED | OUTPATIENT
Start: 2022-12-19 | End: 2023-05-03

## 2022-12-19 RX ORDER — METOPROLOL TARTRATE 25 MG/1
12.5 TABLET, FILM COATED ORAL 2 TIMES DAILY
Qty: 90 TABLET | Refills: 3 | Status: SHIPPED | OUTPATIENT
Start: 2022-12-19 | End: 2023-09-18

## 2022-12-19 RX ORDER — LISINOPRIL 10 MG/1
10 TABLET ORAL DAILY
Qty: 90 TABLET | Refills: 3 | Status: SHIPPED | OUTPATIENT
Start: 2022-12-19 | End: 2023-10-12

## 2022-12-19 NOTE — TELEPHONE ENCOUNTER
[facility] calls to get glipizide Rx transferred to them from [facility]. Patient is requesting these (there are previous encounters re: this as well), but [facility] has not been responsive with the request, so they're asking us to send Rx.    RN sees active Rx for glipizide on file from PCP, so transferred this with remaining refills to [facility] as requested.     Kathia Vasquez RN  [facility]

## 2022-12-19 NOTE — TELEPHONE ENCOUNTER
Patient is requesting lisinopril (ZESTRIL) 10 MG tablet and metoprolol tartrate (LOPRESSOR) 25 MG tablet be sent to different pharmacy.

## 2023-02-11 ENCOUNTER — OFFICE VISIT (OUTPATIENT)
Dept: URGENT CARE | Facility: URGENT CARE | Age: 63
End: 2023-02-11
Payer: COMMERCIAL

## 2023-02-11 VITALS
SYSTOLIC BLOOD PRESSURE: 162 MMHG | DIASTOLIC BLOOD PRESSURE: 78 MMHG | WEIGHT: 261 LBS | OXYGEN SATURATION: 96 % | TEMPERATURE: 97.8 F | BODY MASS INDEX: 36.92 KG/M2 | HEART RATE: 84 BPM

## 2023-02-11 DIAGNOSIS — E11.65 UNCONTROLLED TYPE 2 DIABETES MELLITUS WITH HYPERGLYCEMIA (H): ICD-10-CM

## 2023-02-11 DIAGNOSIS — K61.1 PERIRECTAL ABSCESS: Primary | ICD-10-CM

## 2023-02-11 DIAGNOSIS — I10 ESSENTIAL HYPERTENSION: ICD-10-CM

## 2023-02-11 PROCEDURE — 99214 OFFICE O/P EST MOD 30 MIN: CPT | Performed by: FAMILY MEDICINE

## 2023-02-11 NOTE — PROGRESS NOTES
SUBJECTIVE:  HPI:  62 year old male presents with abscess formation on rectum  for 2 days.  No   fever.       Recently came back from trip to [facility]  There he had several bouts of travellers diarrhea which has now resolved    Past 2 days erythema pain and swelling gluteal area  Worse today     Problem list, Medication list, Allergies, and Medical/Social/Surgical histories reviewed in [facility] and updated as appropriate.      ROS:  Constitutional: as above  Cardiology: no chest pain  Respiratory: no shortness of breath  Skin: as above    OBJECTIVE:  General: no acute distress  EYE: conjunctiva clear  SKIN:    Indurated and tender swelling left perianal area - more dorsal and above the perirectal area  Not fluctuant    Neuro: no neurologic deficits  Psych: normal mood and affect  Vascular: good pulses and cap refill    ASSESSMENT:  No diagnosis found.    ICD-10-CM    1. Perirectal abscess  K61.1 amoxicillin-clavulanate (AUGMENTIN) 875-125 MG tablet     Adult General Surg Referral      2. Essential hypertension  I10       3. Uncontrolled type 2 diabetes mellitus with hyperglycemia (H)  E11.65             PLAN:  Perirectal abscess vs pilonidal cyst vs folliculitis  Not fluctuant.   We discussed ER for Incision and Drainage vs trial of antibiotics over the weekend, patient opted for trial antibiotics  Prescribed with augmentin  Side effects discussed warned about GI side effects and risk of cdiff.  See AVS   Referred to Gen Surg for follow up   To ER if symptoms do not improve over the weekend, ASAP if worsening  Follow up with primary care provider for type 2 DM   Patient had no further questions    BP elevated  BP elevated today but asymptomatic. No headache no blurring of vision no chest pain no shortness of breath no dizziness no unsteadiness no numbness no weakness  Patient has not taken his bp meds yet today and unable to stay seated with pain  Patient will check at home after he takes his meds     Lesley Hurtado  M.D.

## 2023-02-16 ENCOUNTER — TELEPHONE (OUTPATIENT)
Dept: URGENT CARE | Facility: URGENT CARE | Age: 63
End: 2023-02-16
Payer: COMMERCIAL

## 2023-02-16 DIAGNOSIS — K61.1 PERIRECTAL ABSCESS: ICD-10-CM

## 2023-02-16 NOTE — TELEPHONE ENCOUNTER
Medication Question or Refill    Contacts       Type Contact Phone/Fax    02/16/2023 09:55 AM CST Phone (Incoming) Yan Willis (Self) [phone] (M)          What medication are you calling about (include dose and sig)?: Augmentin    Preferred Pharmacy:    [id number] [place] - [address]  [id number] [address] [address]  Phone: [phone] Fax: [phone]        Controlled Substance Agreement on file:   CSA -- Patient Level:    CSA: None found at the patient level.       Who prescribed the medication?: Dr Hurtado    Do you need a refill? Yes-patient is better, but not completely better. Wondering if he can get a refill.    When did you use the medication last?     Patient offered an appointment? No    Do you have any questions or concerns?  Yes: Not completely better      Could we send this information to you in [facility] or would you prefer to receive a phone call?:   Patient would prefer a phone call   Okay to leave a detailed message?: Yes at Cell number on file:    Telephone Information:   Mobile [phone]       Katiana Harkins MA/THOMAS

## 2023-02-16 NOTE — TELEPHONE ENCOUNTER
Please advise patient he should follow-up with PCP if symptoms have not resolved. Should also schedule gen surg appt as recommended at visit.

## 2023-02-16 NOTE — TELEPHONE ENCOUNTER
Pt returned call to clinic and provider 2/16/2023 10:04 AM message below was relayed to pt.    Pt requests message be held for Dr. Hurtado to review his request for refill to extend current antibiotic therapy.    Pt states sx are improving, but not resolved, and he believes extending current therapy may be effective.  RN advised if pt does not receive a reply as requested he should be seen for reevaluation as advised via 2/16/2023 10:04 AM message below as it is not known on what date Dr. Hurtado will be available to review his request.    Pt states he has not scheduled with General Surgeon yet because it was his understanding that General Surgery appointment would be necessary only if sx do not improve, pt states since sx have improved but not resolved, he wishes to wait to schedule with General Surgery until antibiotic prove to be ineffective. RN encouraged pt to schedule an appointment with General Surgery, and cancel only if sx have completely resolved by the time of appointment to avoid delay in care if sx persist upon completion of antibiotic course as they have at this time.    Julissa Kearns, JACEKN, RN

## 2023-02-16 NOTE — TELEPHONE ENCOUNTER
Spoke with patient and encouraged him to follow up with General Surgery and he agrees  Symptoms are much better he states (patient unable to quantify but states pain is gone, still swollen but better)  but he is just concerned as the treatment  Will be done in 2 days and he does feel needs a longer course.  Patient given an additional 5 days but advised again  Prescribed with augmentin  Side effects discussed warned about GI side effects and risk of cdiff.  Advised to schedule Surgery follow up and cancel if he feels he is much better but good to have in case this persist  Patient voiced understanding and had no further questions  Lesley Hurtado M.D.

## 2023-02-16 NOTE — TELEPHONE ENCOUNTER
Tried calling patient- no answer and not able to leave voicemail. Phone stopped ringing and went silent.

## 2023-03-21 ENCOUNTER — TELEPHONE (OUTPATIENT)
Dept: FAMILY MEDICINE | Facility: CLINIC | Age: 63
End: 2023-03-21
Payer: COMMERCIAL

## 2023-03-21 NOTE — TELEPHONE ENCOUNTER
Patient Quality Outreach    Patient is due for the following:   Diabetes -  BP Check    Next Steps:   Schedule a nurse only visit for bp recheck    Type of outreach:    Phone, left message for patient/parent to call back.      Questions for provider review:    None     Nancy Williamson, CMA

## 2023-04-01 ENCOUNTER — TRANSFERRED RECORDS (OUTPATIENT)
Dept: MULTI SPECIALTY CLINIC | Facility: CLINIC | Age: 63
End: 2023-04-01

## 2023-04-01 LAB — RETINOPATHY: NORMAL

## 2023-04-09 ENCOUNTER — HEALTH MAINTENANCE LETTER (OUTPATIENT)
Age: 63
End: 2023-04-09

## 2023-05-03 ENCOUNTER — OFFICE VISIT (OUTPATIENT)
Dept: FAMILY MEDICINE | Facility: CLINIC | Age: 63
End: 2023-05-03
Payer: COMMERCIAL

## 2023-05-03 VITALS
BODY MASS INDEX: 37.08 KG/M2 | HEIGHT: 70 IN | RESPIRATION RATE: 20 BRPM | DIASTOLIC BLOOD PRESSURE: 80 MMHG | SYSTOLIC BLOOD PRESSURE: 120 MMHG | HEART RATE: 85 BPM | OXYGEN SATURATION: 97 % | WEIGHT: 259 LBS | TEMPERATURE: 97.2 F

## 2023-05-03 DIAGNOSIS — I10 ESSENTIAL HYPERTENSION: ICD-10-CM

## 2023-05-03 DIAGNOSIS — E66.01 MORBID OBESITY (H): ICD-10-CM

## 2023-05-03 DIAGNOSIS — E11.59 TYPE 2 DIABETES MELLITUS WITH OTHER CIRCULATORY COMPLICATION, WITHOUT LONG-TERM CURRENT USE OF INSULIN (H): Primary | ICD-10-CM

## 2023-05-03 DIAGNOSIS — I21.4 NSTEMI (NON-ST ELEVATED MYOCARDIAL INFARCTION) (H): ICD-10-CM

## 2023-05-03 DIAGNOSIS — Z95.2 S/P AORTIC VALVE REPLACEMENT: ICD-10-CM

## 2023-05-03 DIAGNOSIS — R01.1 HEART MURMUR: ICD-10-CM

## 2023-05-03 DIAGNOSIS — E78.5 HYPERLIPIDEMIA LDL GOAL <100: ICD-10-CM

## 2023-05-03 LAB
CHOLEST SERPL-MCNC: 199 MG/DL
HBA1C MFR BLD: 7.3 % (ref 0–5.6)
HDLC SERPL-MCNC: 37 MG/DL
LDLC SERPL CALC-MCNC: ABNORMAL MG/DL
LDLC SERPL DIRECT ASSAY-MCNC: 86 MG/DL
NONHDLC SERPL-MCNC: 162 MG/DL
TRIGL SERPL-MCNC: 560 MG/DL

## 2023-05-03 PROCEDURE — 83036 HEMOGLOBIN GLYCOSYLATED A1C: CPT | Performed by: FAMILY MEDICINE

## 2023-05-03 PROCEDURE — 99207 PR FOOT EXAM NO CHARGE: CPT | Mod: 25 | Performed by: FAMILY MEDICINE

## 2023-05-03 PROCEDURE — 99214 OFFICE O/P EST MOD 30 MIN: CPT | Performed by: FAMILY MEDICINE

## 2023-05-03 PROCEDURE — 83721 ASSAY OF BLOOD LIPOPROTEIN: CPT | Mod: 59 | Performed by: FAMILY MEDICINE

## 2023-05-03 PROCEDURE — 80061 LIPID PANEL: CPT | Performed by: FAMILY MEDICINE

## 2023-05-03 PROCEDURE — 36415 COLL VENOUS BLD VENIPUNCTURE: CPT | Performed by: FAMILY MEDICINE

## 2023-05-03 RX ORDER — LANCETS
EACH MISCELLANEOUS
Qty: 100 EACH | Refills: 6 | Status: SHIPPED | OUTPATIENT
Start: 2023-05-03

## 2023-05-03 RX ORDER — GLUCOSAMINE HCL/CHONDROITIN SU 500-400 MG
CAPSULE ORAL
Qty: 100 EACH | Refills: 3 | Status: SHIPPED | OUTPATIENT
Start: 2023-05-03 | End: 2024-05-20

## 2023-05-03 RX ORDER — GLIPIZIDE 10 MG/1
10 TABLET, FILM COATED, EXTENDED RELEASE ORAL DAILY
Qty: 90 TABLET | Refills: 3 | Status: SHIPPED | OUTPATIENT
Start: 2023-05-03 | End: 2024-01-10

## 2023-05-03 ASSESSMENT — PAIN SCALES - GENERAL: PAINLEVEL: NO PAIN (0)

## 2023-05-03 NOTE — PATIENT INSTRUCTIONS
Decrease Glipizide to 10 mg daily.     Continue other medications.     Follow up in 6 months.     Lab work today.     Diabetic supplies provided

## 2023-05-03 NOTE — PROGRESS NOTES
"  Assessment & Plan     Type 2 diabetes mellitus with other circulatory complication, without long-term current use of insulin (H)  HTN  HLD  Current regimen:  [id number] mg daily  Glipizide XL 10 mg twice daily  Atorvastatin 40 mg daily  Lisinopril 10 mg daily  Metoprol tartrate 12.5 mg twice daily  Last A1c of 6.8 on 10/19/2022  Tobacco use 1-2 cigars weekly.   Not checking blood sugars at home. Not interested in doing this.   -- Plan to decrease glipizide to 10 mg daily to reduce risk of hypoglycemia with the empagliflozin.   -- Provided with diabetic meter supplies today.   -- Check A1c and Lipids today.   -- Follow up in 6 months.   -- Encouraged tobacco cessation ( smoking 1-2 cigars weekly)   - Hemoglobin A1c  - glipiZIDE (GLUCOTROL XL) 10 MG 24 hr tablet  Dispense: 90 tablet; Refill: 3  - blood glucose monitoring (NO BRAND SPECIFIED) meter device kit  Dispense: 1 kit; Refill: 0  - blood glucose (NO BRAND SPECIFIED) test strip  Dispense: 100 strip; Refill: 6  - blood glucose calibration (NO BRAND SPECIFIED) solution  Dispense: 1 each; Refill: 0  - thin (NO BRAND SPECIFIED) lancets  Dispense: 100 each; Refill: 6  - alcohol swab prep pads  Dispense: 100 each; Refill: 3  - Hemoglobin A1c  - Basic metabolic panel  (Ca, Cl, CO2, Creat, Gluc, K, Na, BUN)  - Albumin Random Urine Quantitative with Creat Ratio  - FOOT EXAM    Hyperlipidemia LDL goal <100  - Lipid panel reflex to direct LDL Fasting    Heart murmur  S/p Aortic valve replacement.   NSTEM (H)  -- stable, advised Cardiology follow up.     Morbid obesity (H)  BMI 36. Diabetes and HTN improve with weight loss.         BMI:   Estimated body mass index is 36.9 kg/m  as calculated from the following:    Height as of this encounter: 1.784 m (5' 10.25\").    Weight as of this encounter: 117.5 kg (259 lb).   Weight management plan: Discussed healthy diet and exercise guidelines    The risks, benefits and treatment options of prescribed medications or other " treatments have been discussed with the patient. The patient verbalized their understanding and should call or follow up if no improvement or if they develop further problems.    >30 minutes spent on the date of the encounter doing chart review, history and exam, documentation and further activities as noted above      Follow up for diabetic visit in 6 months.         Mark Henao DO  [facility]    Emeka Heredia is a 62 year old, presenting for the following health issues:  Hypertension and Diabetes                   History of Present Illness       Diabetes:   He presents for follow up of diabetes.  He is not checking blood glucose. He has no concerns regarding his diabetes at this time.  He is having excessive thirst. The patient has not had a diabetic eye exam in the last 12 months.         Hyperlipidemia:  He presents for follow up of hyperlipidemia.  He is taking medication to lower cholesterol. He is not having myalgia or other side effects to statin medications.    Hypertension: He presents for follow up of hypertension.  He does not check blood pressure  regularly outside of the clinic. Outpatient blood pressures have not been over 140/90. He follows a low salt diet.     He eats 2-3 servings of fruits and vegetables daily.He consumes 1 sweetened beverage(s) daily.He exercises with enough effort to increase his heart rate 9 or less minutes per day.  He exercises with enough effort to increase his heart rate 3 or less days per week. He is missing 1 dose(s) of medications per week.         DMT2  HTN  HLD  [id number] mg daily  Glipizide XL 10 mg twice daily  Atorvastatin 40 mg daily  Lisinopril 10 mg daily  Metoprol tartrate 12.5 mg twice daily  Last A1c of 6.8 on 10/19/2022  Tobacco use 1-2 cigars weekly.   Not checking blood sugars at home. Not interested in doing this.             BP Readings from Last 2 Encounters:   05/03/23 120/80   02/11/23 (!) 162/78     Hemoglobin A1C  "(%)   Date Value   10/19/2022 6.8 (H)   06/15/2022 7.3 (H)   12/16/2020 8.0 (H)   06/24/2020 7.5 (H)     LDL Cholesterol Calculated   Date Value   10/19/2022      Comment:     Cannot estimate LDL when triglyceride exceeds 400 mg/dL   10/08/2021      Comment:     Cannot estimate LDL when triglyceride exceeds 400 mg/dL   06/24/2020     Cannot estimate LDL when triglyceride exceeds 400 mg/dL mg/dL   03/20/2019     Cannot estimate LDL when triglyceride exceeds 400 mg/dL mg/dL     LDL Cholesterol Direct (mg/dL)   Date Value   10/19/2022 81   10/08/2021 74   06/24/2020 73   03/20/2019 59       Review of Systems   Constitutional, HEENT, cardiovascular, pulmonary, gi and gu systems are negative, except as otherwise noted.      Objective    /80 (BP Location: Left arm, Patient Position: Chair, Cuff Size: Adult Large)   Pulse 85   Temp 97.2  F (36.2  C) (Tympanic)   Resp 20   Ht 1.784 m (5' 10.25\")   Wt 117.5 kg (259 lb)   SpO2 97%   BMI 36.90 kg/m    Body mass index is 36.9 kg/m .  Physical Exam   General: alert, cooperative, no acute distress   CV: RRR, systolic murmur present.   Resp: non-labored breathing, clear to auscultation, no wheezing or rales   Extremities: No peripheral edema, calves non-tender.  No open sores or lesions.  Monofilament testing 10 out of 10 bilaterally.  Pulses strong.            "

## 2023-05-05 DIAGNOSIS — I25.10 CORONARY ARTERY DISEASE DUE TO LIPID RICH PLAQUE: ICD-10-CM

## 2023-05-05 DIAGNOSIS — I25.83 CORONARY ARTERY DISEASE DUE TO LIPID RICH PLAQUE: ICD-10-CM

## 2023-05-05 DIAGNOSIS — E78.5 HYPERLIPIDEMIA LDL GOAL <100: ICD-10-CM

## 2023-05-05 RX ORDER — ATORVASTATIN CALCIUM 80 MG/1
80 TABLET, FILM COATED ORAL DAILY
Qty: 90 TABLET | Refills: 3 | Status: SHIPPED | OUTPATIENT
Start: 2023-05-05 | End: 2024-01-10

## 2023-05-09 ENCOUNTER — TELEPHONE (OUTPATIENT)
Dept: FAMILY MEDICINE | Facility: CLINIC | Age: 63
End: 2023-05-09

## 2023-05-09 DIAGNOSIS — E11.59 TYPE 2 DIABETES MELLITUS WITH OTHER CIRCULATORY COMPLICATION, WITHOUT LONG-TERM CURRENT USE OF INSULIN (H): ICD-10-CM

## 2023-09-16 DIAGNOSIS — I10 ESSENTIAL HYPERTENSION: ICD-10-CM

## 2023-09-16 DIAGNOSIS — I25.10 CORONARY ARTERY DISEASE DUE TO LIPID RICH PLAQUE: ICD-10-CM

## 2023-09-16 DIAGNOSIS — I25.83 CORONARY ARTERY DISEASE DUE TO LIPID RICH PLAQUE: ICD-10-CM

## 2023-09-18 RX ORDER — METOPROLOL TARTRATE 25 MG/1
TABLET, FILM COATED ORAL
Qty: 90 TABLET | Refills: 2 | Status: SHIPPED | OUTPATIENT
Start: 2023-09-18 | End: 2024-08-28

## 2023-09-19 ENCOUNTER — TELEPHONE (OUTPATIENT)
Dept: FAMILY MEDICINE | Facility: CLINIC | Age: 63
End: 2023-09-19
Payer: COMMERCIAL

## 2023-10-12 DIAGNOSIS — I10 ESSENTIAL HYPERTENSION: ICD-10-CM

## 2023-10-12 DIAGNOSIS — E11.59 TYPE 2 DIABETES MELLITUS WITH OTHER CIRCULATORY COMPLICATION, WITHOUT LONG-TERM CURRENT USE OF INSULIN (H): ICD-10-CM

## 2023-10-12 RX ORDER — LISINOPRIL 10 MG/1
10 TABLET ORAL DAILY
Qty: 90 TABLET | Refills: 0 | Status: SHIPPED | OUTPATIENT
Start: 2023-10-12 | End: 2024-01-10

## 2023-10-12 RX ORDER — EMPAGLIFLOZIN 10 MG/1
10 TABLET, FILM COATED ORAL DAILY
Qty: 90 TABLET | Refills: 0 | Status: SHIPPED | OUTPATIENT
Start: 2023-10-12 | End: 2024-01-10

## 2023-10-12 NOTE — TELEPHONE ENCOUNTER
Needs appointment/blood work for further refills.   Prescription approved per [facility] Refill Protocol.  Julie Behrendt RN

## 2023-11-19 ENCOUNTER — MYC MEDICAL ADVICE (OUTPATIENT)
Dept: FAMILY MEDICINE | Facility: CLINIC | Age: 63
End: 2023-11-19
Payer: COMMERCIAL

## 2023-11-19 ENCOUNTER — HEALTH MAINTENANCE LETTER (OUTPATIENT)
Age: 63
End: 2023-11-19

## 2023-11-20 NOTE — TELEPHONE ENCOUNTER
MyChart reply sent to patient and closing encounter.     Kathia Vasquez RN  [facility]

## 2024-01-03 ENCOUNTER — LAB (OUTPATIENT)
Dept: LAB | Facility: CLINIC | Age: 64
End: 2024-01-03
Payer: COMMERCIAL

## 2024-01-03 DIAGNOSIS — I25.83 CORONARY ARTERY DISEASE DUE TO LIPID RICH PLAQUE: ICD-10-CM

## 2024-01-03 DIAGNOSIS — I25.10 CORONARY ARTERY DISEASE DUE TO LIPID RICH PLAQUE: ICD-10-CM

## 2024-01-03 DIAGNOSIS — E11.59 TYPE 2 DIABETES MELLITUS WITH OTHER CIRCULATORY COMPLICATION, WITHOUT LONG-TERM CURRENT USE OF INSULIN (H): ICD-10-CM

## 2024-01-03 DIAGNOSIS — E78.5 HYPERLIPIDEMIA LDL GOAL <100: ICD-10-CM

## 2024-01-03 LAB
ANION GAP SERPL CALCULATED.3IONS-SCNC: 11 MMOL/L (ref 7–15)
BUN SERPL-MCNC: 16.1 MG/DL (ref 8–23)
CALCIUM SERPL-MCNC: 9.8 MG/DL (ref 8.8–10.2)
CHLORIDE SERPL-SCNC: 101 MMOL/L (ref 98–107)
CHOLEST SERPL-MCNC: 190 MG/DL
CREAT SERPL-MCNC: 0.74 MG/DL (ref 0.67–1.17)
CREAT UR-MCNC: 57.5 MG/DL
DEPRECATED HCO3 PLAS-SCNC: 26 MMOL/L (ref 22–29)
EGFRCR SERPLBLD CKD-EPI 2021: >90 ML/MIN/1.73M2
FASTING STATUS PATIENT QL REPORTED: YES
GLUCOSE SERPL-MCNC: 180 MG/DL (ref 70–99)
HBA1C MFR BLD: 7.6 % (ref 0–5.6)
HDLC SERPL-MCNC: 33 MG/DL
LDLC SERPL CALC-MCNC: ABNORMAL MG/DL
LDLC SERPL DIRECT ASSAY-MCNC: 56 MG/DL
MICROALBUMIN UR-MCNC: 14.8 MG/L
MICROALBUMIN/CREAT UR: 25.74 MG/G CR (ref 0–17)
NONHDLC SERPL-MCNC: 157 MG/DL
POTASSIUM SERPL-SCNC: 4.4 MMOL/L (ref 3.4–5.3)
SODIUM SERPL-SCNC: 138 MMOL/L (ref 135–145)
TRIGL SERPL-MCNC: 809 MG/DL

## 2024-01-03 PROCEDURE — 83036 HEMOGLOBIN GLYCOSYLATED A1C: CPT

## 2024-01-03 PROCEDURE — 82043 UR ALBUMIN QUANTITATIVE: CPT

## 2024-01-03 PROCEDURE — 36415 COLL VENOUS BLD VENIPUNCTURE: CPT

## 2024-01-03 PROCEDURE — 83721 ASSAY OF BLOOD LIPOPROTEIN: CPT | Mod: 59

## 2024-01-03 PROCEDURE — 82570 ASSAY OF URINE CREATININE: CPT

## 2024-01-03 PROCEDURE — 80061 LIPID PANEL: CPT

## 2024-01-03 PROCEDURE — 80048 BASIC METABOLIC PNL TOTAL CA: CPT

## 2024-01-10 ENCOUNTER — OFFICE VISIT (OUTPATIENT)
Dept: FAMILY MEDICINE | Facility: CLINIC | Age: 64
End: 2024-01-10
Payer: COMMERCIAL

## 2024-01-10 ENCOUNTER — LAB (OUTPATIENT)
Dept: FAMILY MEDICINE | Facility: CLINIC | Age: 64
End: 2024-01-10

## 2024-01-10 VITALS
OXYGEN SATURATION: 93 % | TEMPERATURE: 98.1 F | SYSTOLIC BLOOD PRESSURE: 112 MMHG | HEART RATE: 87 BPM | DIASTOLIC BLOOD PRESSURE: 72 MMHG | WEIGHT: 260 LBS | HEIGHT: 70 IN | RESPIRATION RATE: 20 BRPM | BODY MASS INDEX: 37.22 KG/M2

## 2024-01-10 DIAGNOSIS — E11.59 TYPE 2 DIABETES MELLITUS WITH OTHER CIRCULATORY COMPLICATION, WITHOUT LONG-TERM CURRENT USE OF INSULIN (H): Primary | ICD-10-CM

## 2024-01-10 DIAGNOSIS — E66.01 MORBID OBESITY (H): ICD-10-CM

## 2024-01-10 DIAGNOSIS — Z12.11 SCREEN FOR COLON CANCER: ICD-10-CM

## 2024-01-10 DIAGNOSIS — I10 ESSENTIAL HYPERTENSION: ICD-10-CM

## 2024-01-10 DIAGNOSIS — Z23 NEED FOR VACCINATION: ICD-10-CM

## 2024-01-10 DIAGNOSIS — I25.83 CORONARY ARTERY DISEASE DUE TO LIPID RICH PLAQUE: ICD-10-CM

## 2024-01-10 DIAGNOSIS — E78.5 HYPERLIPIDEMIA LDL GOAL <70: ICD-10-CM

## 2024-01-10 DIAGNOSIS — I25.10 CORONARY ARTERY DISEASE DUE TO LIPID RICH PLAQUE: ICD-10-CM

## 2024-01-10 DIAGNOSIS — N52.9 ERECTILE DYSFUNCTION, UNSPECIFIED ERECTILE DYSFUNCTION TYPE: ICD-10-CM

## 2024-01-10 DIAGNOSIS — R21 RASH: ICD-10-CM

## 2024-01-10 DIAGNOSIS — E78.1 HIGH BLOOD TRIGLYCERIDES: ICD-10-CM

## 2024-01-10 PROCEDURE — 90715 TDAP VACCINE 7 YRS/> IM: CPT | Performed by: FAMILY MEDICINE

## 2024-01-10 PROCEDURE — 99207 PR FOOT EXAM NO CHARGE: CPT | Performed by: FAMILY MEDICINE

## 2024-01-10 PROCEDURE — 99214 OFFICE O/P EST MOD 30 MIN: CPT | Mod: 25 | Performed by: FAMILY MEDICINE

## 2024-01-10 PROCEDURE — 90471 IMMUNIZATION ADMIN: CPT | Performed by: FAMILY MEDICINE

## 2024-01-10 RX ORDER — GLIPIZIDE 10 MG/1
10 TABLET, FILM COATED, EXTENDED RELEASE ORAL DAILY
Qty: 90 TABLET | Refills: 3 | Status: SHIPPED | OUTPATIENT
Start: 2024-01-10

## 2024-01-10 RX ORDER — TRIAMCINOLONE ACETONIDE 1 MG/G
CREAM TOPICAL
Qty: 80 G | Refills: 2 | Status: CANCELLED | OUTPATIENT
Start: 2024-01-10

## 2024-01-10 RX ORDER — LISINOPRIL 10 MG/1
10 TABLET ORAL DAILY
Qty: 90 TABLET | Refills: 3 | Status: SHIPPED | OUTPATIENT
Start: 2024-01-10

## 2024-01-10 RX ORDER — FENOFIBRATE 145 MG/1
145 TABLET, COATED ORAL DAILY
Qty: 90 TABLET | Refills: 3 | Status: CANCELLED | OUTPATIENT
Start: 2024-01-10

## 2024-01-10 RX ORDER — ATORVASTATIN CALCIUM 80 MG/1
80 TABLET, FILM COATED ORAL DAILY
Qty: 90 TABLET | Refills: 3 | Status: SHIPPED | OUTPATIENT
Start: 2024-01-10 | End: 2024-10-02

## 2024-01-10 ASSESSMENT — PAIN SCALES - GENERAL: PAINLEVEL: NO PAIN (0)

## 2024-01-10 NOTE — NURSING NOTE
Prior to immunization administration, verified patients identity using patient s name and date of birth. Please see Immunization Activity for additional information.     Screening Questionnaire for Adult Immunization    Are you sick today?   No   Do you have allergies to medications, food, a vaccine component or latex?   No   Have you ever had a serious reaction after receiving a vaccination?   No   Do you have a long-term health problem with heart, lung, kidney, or metabolic disease (e.g., diabetes), asthma, a blood disorder, no spleen, complement component deficiency, a cochlear implant, or a spinal fluid leak?  Are you on long-term aspirin therapy?   No   Do you have cancer, leukemia, HIV/AIDS, or any other immune system problem?   No   Do you have a parent, brother, or sister with an immune system problem?   No   In the past 3 months, have you taken medications that affect  your immune system, such as prednisone, other steroids, or anticancer drugs; drugs for the treatment of rheumatoid arthritis, Crohn s disease, or psoriasis; or have you had radiation treatments?   No   Have you had a seizure, or a brain or other nervous system problem?   No   During the past year, have you received a transfusion of blood or blood    products, or been given immune (gamma) globulin or antiviral drug?   No   For women: Are you pregnant or is there a chance you could become       pregnant during the next month?   No   Have you received any vaccinations in the past 4 weeks?   No     Immunization questionnaire answers were all negative.      Patient instructed to remain in clinic for 15 minutes afterwards, and to report any adverse reactions.     Screening performed by Norma Asrhad CMA on 1/10/2024 at 7:28 AM.

## 2024-01-10 NOTE — PATIENT INSTRUCTIONS
Continue current medications.      Lab work in 3 months for triglycerides and testosterone.     Start working out weekly.

## 2024-01-10 NOTE — PROGRESS NOTES
Assessment & Plan     Type 2 diabetes mellitus with other circulatory complication, without long-term current use of insulin (H)  Current regimen:  Empagliflozin 10 mg daily  Glipizide 10 mg daily  Lisinopril 10 mg today  Atorvastatin 80 mg daily  meToprol tartrate 12.5 mg twice daily  Last A1c 7.6 on 1/3/2024  -- continue current cares. Continues to smoke cigar 2 per week. No interest in quitting.   - empagliflozin (JARDIANCE) 10 MG TABS tablet  Dispense: 90 tablet; Refill: 3  - glipiZIDE (GLUCOTROL XL) 10 MG 24 hr tablet  Dispense: 90 tablet; Refill: 3  - Hemoglobin A1c    Hyperlipidemia LDL goal <70  High triglycerides  -- refill provided.   -- not interested in fenofibrate. Wishes to work on diet. Recheck in 3 months. If elevated then fenofibrate.   - atorvastatin (LIPITOR) 80 MG tablet  Dispense: 90 tablet; Refill: 3  - Lipid panel reflex to direct LDL Fasting    Essential hypertension  Stable. Refill provided.   - lisinopril (ZESTRIL) 10 MG tablet  Dispense: 90 tablet; Refill: 3    Coronary artery disease due to lipid rich plaque  - atorvastatin (LIPITOR) 80 MG tablet  Dispense: 90 tablet; Refill: 3    Rash  Uses kenalog cream on rash on arms and also in groin region.     Screen for colon cancer  - LEEANNA([employer])      Erectile dysfunction, unspecified erectile dysfunction type  Viagra and cialis not helpful. Wishes to pursue penile implant. Discussed should check testosterone level first. He wishes to wait a few months before proceeding with this.   - Testosterone total    Morbid obesity (H)  BMI 37, diabetes improve with weight loss.     The risks, benefits and treatment options of prescribed medications or other treatments have been discussed with the patient. The patient verbalized their understanding and should call or follow up if no improvement or if they develop further problems.        Mark Henao, [facility]    Emeka Heredia is a 63 year old, presenting  for the following health issues:  Lipids, Hypertension, and Diabetes        1/10/2024     7:16 AM   Additional Questions   Roomed by Norma AYERS       History of Present Illness       Diabetes:   He presents for follow up of diabetes.    He is not checking blood glucose.        He is concerned about other.   He is having excessive thirst.  The patient has had a diabetic eye exam in the last 12 months. Eye exam performed on April 2023. Location of last eye exam [facility].        Hyperlipidemia:  He presents for follow up of hyperlipidemia.   He is taking medication to lower cholesterol. He is not having myalgia or other side effects to statin medications.    Hypertension: He presents for follow up of hypertension.  He does not check blood pressure  regularly outside of the clinic. Outpatient blood pressures have not been over 140/90. He does not follow a low salt diet.     He eats 2-3 servings of fruits and vegetables daily.He consumes 0 sweetened beverage(s) daily.He exercises with enough effort to increase his heart rate 9 or less minutes per day.  He exercises with enough effort to increase his heart rate 3 or less days per week. He is missing 1 dose(s) of medications per week.  He is not taking prescribed medications regularly due to remembering to take.         61-year-old male presents to clinic for a diabetic follow-up visit.    Diabetes mellitus type 2  Hypertension  Hyperlipidemia  Current regimen:  Empagliflozin 10 mg daily  Glipizide 10 mg daily  Lisinopril 10 mg today  Atorvastatin 80 mg daily  meToprol tartrate 12.5 mg twice daily  Last A1c 7.6 on 1/3/2024 (a.m. prior to this 7.3 in 5/2/2023)  Last LDL 56    Hypertriglyceridemia  Mostly triglycerides 809.  Already on fish oil and max dose statin.  No exercise currently.   Not interested in a medication for lowering.   Wishes to try and utilize exercise for this.   Tobacco smokes cigars 2 per week.       ED   Wishes to see Urology for penile implant.  "  Cialis and viagra not helpful.   No prior testosterone level.       Review of Systems         Objective    /72 (BP Location: Right arm, Patient Position: Chair, Cuff Size: Adult Large)   Pulse 87   Temp 98.1  F (36.7  C) (Oral)   Resp 20   Ht 1.778 m (5' 10\")   Wt 117.9 kg (260 lb)   SpO2 93%   BMI 37.31 kg/m    Body mass index is 37.31 kg/m .  Physical Exam   General: alert, cooperative, no acute distress   CV: RRR, no murmur  Resp: non-labored breathing, clear to auscultation, no wheezing or rales   Abdomen: Soft, non-tender, no guarding.   Extremities: No peripheral edema, calves non-tender.   Feet: no open sore or lesions. Pulses strong. Sensation intact to light touch and monofilament.           "

## 2024-02-21 LAB — NONINV COLON CA DNA+OCC BLD SCRN STL QL: NEGATIVE

## 2024-04-23 ENCOUNTER — TELEPHONE (OUTPATIENT)
Dept: FAMILY MEDICINE | Facility: CLINIC | Age: 64
End: 2024-04-23
Payer: COMMERCIAL

## 2024-04-23 NOTE — TELEPHONE ENCOUNTER
Patient Quality Outreach    Patient is due for the following:   Diabetes -  A1C, LDL (Fasting), and Eye Exam    Next Steps:   Schedule a lab only visit for fasting labs office visit for diabetes    Type of outreach:    Sent [employer] message. and Left message to call back      Questions for provider review:    None           Norma Arshad, [facility]

## 2024-05-17 DIAGNOSIS — E11.59 TYPE 2 DIABETES MELLITUS WITH OTHER CIRCULATORY COMPLICATION, WITHOUT LONG-TERM CURRENT USE OF INSULIN (H): ICD-10-CM

## 2024-05-20 RX ORDER — UBIQUINOL 100 MG
CAPSULE ORAL
Qty: 100 EACH | Refills: 2 | Status: SHIPPED | OUTPATIENT
Start: 2024-05-20

## 2024-05-20 NOTE — TELEPHONE ENCOUNTER
Prescription approved per [facility] Refill Protocol     Shahla Steinberg     RN MSN

## 2024-05-22 ENCOUNTER — LAB (OUTPATIENT)
Dept: LAB | Facility: CLINIC | Age: 64
End: 2024-05-22
Payer: COMMERCIAL

## 2024-05-22 DIAGNOSIS — E78.5 HYPERLIPIDEMIA LDL GOAL <70: ICD-10-CM

## 2024-05-22 DIAGNOSIS — N52.9 ERECTILE DYSFUNCTION, UNSPECIFIED ERECTILE DYSFUNCTION TYPE: ICD-10-CM

## 2024-05-22 LAB
CHOLEST SERPL-MCNC: 162 MG/DL
FASTING STATUS PATIENT QL REPORTED: YES
HDLC SERPL-MCNC: 36 MG/DL
LDLC SERPL CALC-MCNC: ABNORMAL MG/DL
LDLC SERPL DIRECT ASSAY-MCNC: 62 MG/DL
NONHDLC SERPL-MCNC: 126 MG/DL
TRIGL SERPL-MCNC: 488 MG/DL

## 2024-05-22 PROCEDURE — 36415 COLL VENOUS BLD VENIPUNCTURE: CPT

## 2024-05-22 PROCEDURE — 83721 ASSAY OF BLOOD LIPOPROTEIN: CPT | Mod: 59

## 2024-05-22 PROCEDURE — 80061 LIPID PANEL: CPT

## 2024-05-22 PROCEDURE — 84403 ASSAY OF TOTAL TESTOSTERONE: CPT

## 2024-05-24 ENCOUNTER — MYC MEDICAL ADVICE (OUTPATIENT)
Dept: FAMILY MEDICINE | Facility: CLINIC | Age: 64
End: 2024-05-24
Payer: COMMERCIAL

## 2024-05-24 LAB — TESTOST SERPL-MCNC: 243 NG/DL (ref 240–950)

## 2024-06-16 ENCOUNTER — HEALTH MAINTENANCE LETTER (OUTPATIENT)
Age: 64
End: 2024-06-16

## 2024-08-25 ENCOUNTER — HEALTH MAINTENANCE LETTER (OUTPATIENT)
Age: 64
End: 2024-08-25

## 2024-08-27 DIAGNOSIS — I25.83 CORONARY ARTERY DISEASE DUE TO LIPID RICH PLAQUE: ICD-10-CM

## 2024-08-27 DIAGNOSIS — I10 ESSENTIAL HYPERTENSION: ICD-10-CM

## 2024-08-27 DIAGNOSIS — I25.10 CORONARY ARTERY DISEASE DUE TO LIPID RICH PLAQUE: ICD-10-CM

## 2024-08-28 RX ORDER — METOPROLOL TARTRATE 25 MG/1
TABLET, FILM COATED ORAL
Qty: 90 TABLET | Refills: 2 | Status: SHIPPED | OUTPATIENT
Start: 2024-08-28

## 2024-10-02 ENCOUNTER — OFFICE VISIT (OUTPATIENT)
Dept: FAMILY MEDICINE | Facility: CLINIC | Age: 64
End: 2024-10-02
Payer: COMMERCIAL

## 2024-10-02 VITALS
OXYGEN SATURATION: 97 % | HEIGHT: 70 IN | TEMPERATURE: 98.1 F | RESPIRATION RATE: 16 BRPM | SYSTOLIC BLOOD PRESSURE: 122 MMHG | BODY MASS INDEX: 35.65 KG/M2 | HEART RATE: 76 BPM | DIASTOLIC BLOOD PRESSURE: 75 MMHG | WEIGHT: 249 LBS

## 2024-10-02 DIAGNOSIS — Z00.00 ROUTINE HISTORY AND PHYSICAL EXAMINATION OF ADULT: Primary | ICD-10-CM

## 2024-10-02 DIAGNOSIS — Z95.2 S/P AORTIC VALVE REPLACEMENT: ICD-10-CM

## 2024-10-02 DIAGNOSIS — Z12.5 SCREENING PSA (PROSTATE SPECIFIC ANTIGEN): ICD-10-CM

## 2024-10-02 DIAGNOSIS — I25.83 CORONARY ARTERY DISEASE DUE TO LIPID RICH PLAQUE: ICD-10-CM

## 2024-10-02 DIAGNOSIS — E11.59 TYPE 2 DIABETES MELLITUS WITH OTHER CIRCULATORY COMPLICATION, WITHOUT LONG-TERM CURRENT USE OF INSULIN (H): ICD-10-CM

## 2024-10-02 DIAGNOSIS — E78.5 HYPERLIPIDEMIA LDL GOAL <70: ICD-10-CM

## 2024-10-02 DIAGNOSIS — I25.10 CORONARY ARTERY DISEASE DUE TO LIPID RICH PLAQUE: ICD-10-CM

## 2024-10-02 DIAGNOSIS — I21.4 NSTEMI (NON-ST ELEVATED MYOCARDIAL INFARCTION) (H): ICD-10-CM

## 2024-10-02 PROBLEM — Z71.89 ADVANCED DIRECTIVES, COUNSELING/DISCUSSION: Status: ACTIVE | Noted: 2024-10-02

## 2024-10-02 PROBLEM — E11.65 UNCONTROLLED TYPE 2 DIABETES MELLITUS WITH HYPERGLYCEMIA (H): Status: RESOLVED | Noted: 2019-07-24 | Resolved: 2024-10-02

## 2024-10-02 LAB
ALBUMIN SERPL BCG-MCNC: 4.7 G/DL (ref 3.5–5.2)
ALP SERPL-CCNC: 59 U/L (ref 40–150)
ALT SERPL W P-5'-P-CCNC: 48 U/L (ref 0–70)
ANION GAP SERPL CALCULATED.3IONS-SCNC: 12 MMOL/L (ref 7–15)
AST SERPL W P-5'-P-CCNC: 36 U/L (ref 0–45)
BILIRUB SERPL-MCNC: 1.4 MG/DL
BUN SERPL-MCNC: 14.6 MG/DL (ref 8–23)
CALCIUM SERPL-MCNC: 9.8 MG/DL (ref 8.8–10.4)
CHLORIDE SERPL-SCNC: 101 MMOL/L (ref 98–107)
CHOLEST SERPL-MCNC: 129 MG/DL
CREAT SERPL-MCNC: 0.75 MG/DL (ref 0.67–1.17)
CREAT UR-MCNC: 91.1 MG/DL
EGFRCR SERPLBLD CKD-EPI 2021: >90 ML/MIN/1.73M2
EST. AVERAGE GLUCOSE BLD GHB EST-MCNC: 143 MG/DL
FASTING STATUS PATIENT QL REPORTED: YES
FASTING STATUS PATIENT QL REPORTED: YES
GLUCOSE SERPL-MCNC: 145 MG/DL (ref 70–99)
HBA1C MFR BLD: 6.6 % (ref 0–5.6)
HCO3 SERPL-SCNC: 26 MMOL/L (ref 22–29)
HDLC SERPL-MCNC: 37 MG/DL
LDLC SERPL CALC-MCNC: 35 MG/DL
MICROALBUMIN UR-MCNC: <12 MG/L
MICROALBUMIN/CREAT UR: NORMAL MG/G{CREAT}
NONHDLC SERPL-MCNC: 92 MG/DL
POTASSIUM SERPL-SCNC: 4.2 MMOL/L (ref 3.4–5.3)
PROT SERPL-MCNC: 7.9 G/DL (ref 6.4–8.3)
PSA SERPL DL<=0.01 NG/ML-MCNC: 0.85 NG/ML (ref 0–4.5)
SODIUM SERPL-SCNC: 139 MMOL/L (ref 135–145)
TRIGL SERPL-MCNC: 286 MG/DL

## 2024-10-02 PROCEDURE — G0103 PSA SCREENING: HCPCS | Performed by: PHYSICIAN ASSISTANT

## 2024-10-02 PROCEDURE — 36415 COLL VENOUS BLD VENIPUNCTURE: CPT | Performed by: PHYSICIAN ASSISTANT

## 2024-10-02 PROCEDURE — 80053 COMPREHEN METABOLIC PANEL: CPT | Performed by: PHYSICIAN ASSISTANT

## 2024-10-02 PROCEDURE — 99214 OFFICE O/P EST MOD 30 MIN: CPT | Mod: 25 | Performed by: PHYSICIAN ASSISTANT

## 2024-10-02 PROCEDURE — 82570 ASSAY OF URINE CREATININE: CPT | Performed by: PHYSICIAN ASSISTANT

## 2024-10-02 PROCEDURE — 82043 UR ALBUMIN QUANTITATIVE: CPT | Performed by: PHYSICIAN ASSISTANT

## 2024-10-02 PROCEDURE — 80061 LIPID PANEL: CPT | Performed by: PHYSICIAN ASSISTANT

## 2024-10-02 PROCEDURE — 83036 HEMOGLOBIN GLYCOSYLATED A1C: CPT | Performed by: PHYSICIAN ASSISTANT

## 2024-10-02 PROCEDURE — 99396 PREV VISIT EST AGE 40-64: CPT | Performed by: PHYSICIAN ASSISTANT

## 2024-10-02 RX ORDER — ATORVASTATIN CALCIUM 80 MG/1
80 TABLET, FILM COATED ORAL DAILY
Qty: 90 TABLET | Refills: 3 | Status: SHIPPED | OUTPATIENT
Start: 2024-10-02

## 2024-10-02 SDOH — HEALTH STABILITY: PHYSICAL HEALTH: ON AVERAGE, HOW MANY DAYS PER WEEK DO YOU ENGAGE IN MODERATE TO STRENUOUS EXERCISE (LIKE A BRISK WALK)?: 2 DAYS

## 2024-10-02 ASSESSMENT — PAIN SCALES - GENERAL: PAINLEVEL: NO PAIN (0)

## 2024-10-02 ASSESSMENT — SOCIAL DETERMINANTS OF HEALTH (SDOH): HOW OFTEN DO YOU GET TOGETHER WITH FRIENDS OR RELATIVES?: PATIENT DECLINED

## 2024-10-02 NOTE — PROGRESS NOTES
Preventive Care Visit  [facility]  Delano Vaz PA-C, Physician Assistant - Medical  Oct 2, 2024      Assessment & Plan     Routine history and physical examination of adult  Completed, new establish care with me today due to proximity to this clinic vs last    Type 2 diabetes mellitus with other circulatory complication, without long-term current use of insulin (H)  Improved   - Comprehensive metabolic panel (BMP + Alb, Alk Phos, ALT, AST, Total. Bili, TP); Future  - Hemoglobin A1c; Future  - empagliflozin (JARDIANCE) 10 MG TABS tablet; Take 1 tablet (10 mg) by mouth daily.  - Albumin Random Urine Quantitative with Creat Ratio; Future  - Comprehensive metabolic panel (BMP + Alb, Alk Phos, ALT, AST, Total. Bili, TP)    A1c down to 6.6. He would like to discontinue jardiance due to cost he can no longer afford. Discussed benefit of this drug both diabetes and heart disease standpoint.  Will remove for now, continue to monitor. If can afford in future, goal would be to have SGLT2 or GLP on regimen    Hyperlipidemia LDL goal <70  Recheck today   - Lipid panel reflex to direct LDL Fasting; Future  - atorvastatin (LIPITOR) 80 MG tablet; Take 1 tablet (80 mg) by mouth daily.  - Lipid panel reflex to direct LDL Fasting  Refilled meds. Discussed in depth cholesterol panel, goals of treatments and rationale    Coronary artery disease due to lipid rich plaque  As above  - atorvastatin (LIPITOR) 80 MG tablet; Take 1 tablet (80 mg) by mouth daily.    Screening PSA (prostate specific antigen)  recheck  - PSA, screen; Future  - PSA, screen    NSTEMI (non-ST elevated myocardial infarction) (H)  Stable, on BB, aspirin.  Due for cards recheck    S/P aortic valve replacement  Due for repeat ECHO in cards, should follow up.            Nicotine/Tobacco Cessation  He reports that he has been smoking cigars. He has never used smokeless tobacco.  Nicotine/Tobacco Cessation Plan  Self help information given to  "patient      BMI  Estimated body mass index is 35.73 kg/m  as calculated from the following:    Height as of this encounter: 1.778 m (5' 10\").    Weight as of this encounter: 112.9 kg (249 lb).       Counseling  Appropriate preventive services were addressed with this patient via screening, questionnaire, or discussion as appropriate for fall prevention, nutrition, physical activity, Tobacco-use cessation, social engagement, weight loss and cognition.  Checklist reviewing preventive services available has been given to the patient.  Reviewed patient's diet, addressing concerns and/or questions.   He is at risk for lack of exercise and has been provided with information to increase physical activity for the benefit of his well-being.   The patient was instructed to see the dentist every 6 months.       Follow up lab recheck 3 months. Visit in 12    [facility]   Yan is a 64 year old, presenting for the following:  Physical        10/2/2024     6:49 AM   Additional Questions   Roomed by Sammy Christianson Ma   Accompanied by Self        Health Care Directive  Patient does not have a Health Care Directive or Living Will: Discussed advance care planning with patient; however, patient declined at this time.      HPI          10/2/2024   General Health   How would you rate your overall physical health? Good   Feel stress (tense, anxious, or unable to sleep) Not at all            10/2/2024   Nutrition   Three or more servings of calcium each day? (!) NO   Diet: Low salt   How many servings of fruit and vegetables per day? (!) 2-3   How many sweetened beverages each day? 0-1            10/2/2024   Exercise   Days per week of moderate/strenous exercise 2 days      (!) EXERCISE CONCERN      10/2/2024   Social Factors   Frequency of gathering with friends or relatives Patient declined   Worry food won't last until get money to buy more No   Food not last or not have enough money for food? No   Do you have housing? (Housing is defined " as stable permanent housing and does not include staying ouside in a car, in a tent, in an abandoned building, in an overnight shelter, or couch-surfing.) Yes   Are you worried about losing your housing? No   Lack of transportation? No   Unable to get utilities (heat,electricity)? No            10/2/2024   Fall Risk   Fallen 2 or more times in the past year? No   Trouble with walking or balance? No             10/2/2024   Dental   Dentist two times every year? (!) NO            10/2/2024   TB Screening   Were you born outside of the [place]? No              Today's PHQ-2 Score:       1/10/2024     7:09 AM   PHQ-2 ( 1999 [employer])   Q1: Little interest or pleasure in doing things 0   Q2: Feeling down, depressed or hopeless 0   PHQ-2 Score 0   Q1: Little interest or pleasure in doing things Not at all   Q2: Feeling down, depressed or hopeless Not at all   PHQ-2 Score 0         10/2/2024   Substance Use   Alcohol more than 3/day or more than 7/wk No   Do you use any other substances recreationally? No        Social History     Tobacco Use    Smoking status: Some Days     Types: Cigars    Smokeless tobacco: Never    Tobacco comments:     cigar 2 times a week    Vaping Use    Vaping status: Never Used   Substance Use Topics    Alcohol use: Yes     Comment: 6-8 beers per week    Drug use: No             10/2/2024   STI Screening   New sexual partner(s) since last STI/HIV test? No      Last PSA:   PSA   Date Value Ref Range Status   06/24/2020 0.68 0 - 4 ug/L Final     Comment:     Assay Method:  Chemiluminescence using Siemens Vista analyzer     Prostate Specific Antigen Screen   Date Value Ref Range Status   10/19/2022 0.72 0.00 - 4.00 ug/L Final       ASCVD Risk   The ASCVD Risk score (Laura DUFFY, et al., 2019) failed to calculate for the following reasons:    The patient has a prior MI or stroke diagnosis         Reviewed and updated as needed this visit by Provider   Tobacco  Allergies  Meds  Problems  Med Hx   "Surg Hx  Fam Hx            Past Medical History:   Diagnosis Date    Critical aortic valve stenosis     CLARK 0.54, mean gr 85    Dyslipidemia     Hypertension      Past Surgical History:   Procedure Laterality Date    APPENDECTOMY  10/6/1988    REPAIR VALVE AORTIC N/A 3/3/2016    Procedure: REPAIR VALVE AORTIC;  Surgeon: Isaac Yin MD;  Location: [facility] OR    VASECTOMY       Lab work is in process  Labs reviewed in EPIC      Review of Systems  Constitutional, neuro, ENT, endocrine, pulmonary, cardiac, gastrointestinal, genitourinary, musculoskeletal, integument and psychiatric systems are negative, except as otherwise noted.     Objective    Exam  /75   Pulse 76   Temp 98.1  F (36.7  C) (Tympanic)   Resp 16   Ht 1.778 m (5' 10\")   Wt 112.9 kg (249 lb)   SpO2 97%   BMI 35.73 kg/m     Estimated body mass index is 35.73 kg/m  as calculated from the following:    Height as of this encounter: 1.778 m (5' 10\").    Weight as of this encounter: 112.9 kg (249 lb).      Physical Exam  GENERAL: alert and no distress  EYES: Eyes grossly normal to inspection, PERRL and conjunctivae and sclerae normal  HENT: ear canals and TM's normal, nose and mouth without ulcers or lesions  NECK: no adenopathy, no asymmetry, masses, or scars  RESP: lungs clear to auscultation - no rales, rhonchi or wheezes  CV: regular rate and rhythm, normal S1 S2, no S3 or S4, no murmur, click or rub, no peripheral edema  ABDOMEN: soft, nontender, no hepatosplenomegaly, no masses and bowel sounds normal  MS: no gross musculoskeletal defects noted, no edema  Diabetic foot exam: normal DP and PT pulses, no trophic changes or ulcerative lesions, and normal sensory exam        Signed Electronically by: Delano Vaz PA-C    "